# Patient Record
Sex: FEMALE | Race: BLACK OR AFRICAN AMERICAN | NOT HISPANIC OR LATINO | Employment: OTHER | URBAN - METROPOLITAN AREA
[De-identification: names, ages, dates, MRNs, and addresses within clinical notes are randomized per-mention and may not be internally consistent; named-entity substitution may affect disease eponyms.]

---

## 2017-01-20 ENCOUNTER — TELEPHONE (OUTPATIENT)
Dept: HEMATOLOGY/ONCOLOGY | Facility: CLINIC | Age: 27
End: 2017-01-20

## 2017-01-27 RX ORDER — HYDROMORPHONE HYDROCHLORIDE 2 MG/ML
1 INJECTION, SOLUTION INTRAMUSCULAR; INTRAVENOUS; SUBCUTANEOUS EVERY 6 HOURS PRN
Status: CANCELLED
Start: 2017-01-27

## 2017-02-03 DIAGNOSIS — D57.1 HB-SS DISEASE WITHOUT CRISIS: Primary | ICD-10-CM

## 2017-02-10 ENCOUNTER — HOSPITAL ENCOUNTER (OUTPATIENT)
Dept: TRANSFUSION MEDICINE | Facility: HOSPITAL | Age: 27
Discharge: HOME | End: 2017-02-10
Attending: INTERNAL MEDICINE
Payer: MEDICARE

## 2017-02-10 ENCOUNTER — OFFICE VISIT (OUTPATIENT)
Dept: HEMATOLOGY/ONCOLOGY | Facility: CLINIC | Age: 27
End: 2017-02-10
Payer: MEDICARE

## 2017-02-10 VITALS
SYSTOLIC BLOOD PRESSURE: 106 MMHG | HEART RATE: 118 BPM | WEIGHT: 147 LBS | HEIGHT: 65 IN | BODY MASS INDEX: 24.49 KG/M2 | TEMPERATURE: 98 F | RESPIRATION RATE: 20 BRPM | DIASTOLIC BLOOD PRESSURE: 74 MMHG

## 2017-02-10 VITALS
WEIGHT: 147.06 LBS | DIASTOLIC BLOOD PRESSURE: 68 MMHG | OXYGEN SATURATION: 95 % | SYSTOLIC BLOOD PRESSURE: 101 MMHG | TEMPERATURE: 98 F | HEART RATE: 83 BPM

## 2017-02-10 DIAGNOSIS — Z86.73 HISTORY OF STROKE: ICD-10-CM

## 2017-02-10 DIAGNOSIS — D57.1 HB-SS DISEASE WITHOUT CRISIS: Primary | ICD-10-CM

## 2017-02-10 DIAGNOSIS — D57.00 HB-SS DISEASE WITH CRISIS: Primary | ICD-10-CM

## 2017-02-10 LAB
BLD PROD TYP BPU: NORMAL
BLOOD UNIT EXPIRATION DATE: NORMAL
BLOOD UNIT TYPE CODE: 7300
BLOOD UNIT TYPE: NORMAL
CODING SYSTEM: NORMAL
DISPENSE STATUS: NORMAL
NUM UNITS TRANS PACKED RBC: NORMAL

## 2017-02-10 PROCEDURE — 86920 COMPATIBILITY TEST SPIN: CPT

## 2017-02-10 PROCEDURE — 99214 OFFICE O/P EST MOD 30 MIN: CPT | Mod: S$PBB,,, | Performed by: INTERNAL MEDICINE

## 2017-02-10 PROCEDURE — 36415 COLL VENOUS BLD VENIPUNCTURE: CPT

## 2017-02-10 PROCEDURE — P9016 RBC LEUKOCYTES REDUCED: HCPCS

## 2017-02-10 PROCEDURE — 96366 THER/PROPH/DIAG IV INF ADDON: CPT

## 2017-02-10 PROCEDURE — 83020 HEMOGLOBIN ELECTROPHORESIS: CPT

## 2017-02-10 PROCEDURE — 80500 PR  LAB PATHOLOGY CONSULT-LTD: CPT | Mod: ,,, | Performed by: PATHOLOGY

## 2017-02-10 PROCEDURE — 36512 APHERESIS RBC: CPT

## 2017-02-10 PROCEDURE — 96365 THER/PROPH/DIAG IV INF INIT: CPT

## 2017-02-10 PROCEDURE — 96374 THER/PROPH/DIAG INJ IV PUSH: CPT | Mod: 59

## 2017-02-10 PROCEDURE — P9040 RBC LEUKOREDUCED IRRADIATED: HCPCS

## 2017-02-10 PROCEDURE — 36593 DECLOT VASCULAR DEVICE: CPT

## 2017-02-10 PROCEDURE — 63600175 PHARM REV CODE 636 W HCPCS: Performed by: PATHOLOGY

## 2017-02-10 PROCEDURE — 25000003 PHARM REV CODE 250: Performed by: PATHOLOGY

## 2017-02-10 PROCEDURE — 99999 PR PBB SHADOW E&M-EST. PATIENT-LVL III: CPT | Mod: PBBFAC,,, | Performed by: INTERNAL MEDICINE

## 2017-02-10 PROCEDURE — 36512 APHERESIS RBC: CPT | Mod: ,,, | Performed by: PATHOLOGY

## 2017-02-10 PROCEDURE — 96375 TX/PRO/DX INJ NEW DRUG ADDON: CPT | Mod: 59

## 2017-02-10 PROCEDURE — 86902 BLOOD TYPE ANTIGEN DONOR EA: CPT | Mod: 91

## 2017-02-10 RX ORDER — HEPARIN SODIUM 1000 [USP'U]/ML
4400 INJECTION, SOLUTION INTRAVENOUS; SUBCUTANEOUS ONCE
Status: COMPLETED | OUTPATIENT
Start: 2017-02-10 | End: 2017-02-10

## 2017-02-10 RX ORDER — DIPHENHYDRAMINE HYDROCHLORIDE 50 MG/ML
50 INJECTION INTRAMUSCULAR; INTRAVENOUS ONCE
Status: COMPLETED | OUTPATIENT
Start: 2017-02-10 | End: 2017-02-10

## 2017-02-10 RX ADMIN — HEPARIN SODIUM 4400 UNITS: 1000 INJECTION, SOLUTION INTRAVENOUS; SUBCUTANEOUS at 02:02

## 2017-02-10 RX ADMIN — ALTEPLASE 2 MG: 2.2 INJECTION, POWDER, LYOPHILIZED, FOR SOLUTION INTRAVENOUS at 12:02

## 2017-02-10 RX ADMIN — DIPHENHYDRAMINE HYDROCHLORIDE 50 MG: 50 INJECTION INTRAMUSCULAR; INTRAVENOUS at 01:02

## 2017-02-10 NOTE — PROCEDURES
[facility]  Pathology  Procedure Note    SUMMARY     Date of Procedure: 2/10/2017     Procedure: Red Blood Cell Exchange    Provider: Debbie Lambert MD     Assisting Provider: None    Pre-Procedure Diagnosis: sickle cell disease with history of multiple CVAs  Post-Procedure Diagnosis: sickle cell disease with history of multiple CVAs  Follow-up Assessment: 26 year old female from [place] with sickle cell disease and history of multiple CVAs presents for monthly RBC exchange transfusion. Patient is minimally verbal and is wheelchair bound. Care givers are present throughout procedure. Patient will be exchanged 5 units C, E, K negative sickle negative B+ blood. Goal exchange target is to increase post-exchange hct to 30+-3% and hgb S <30%  Sickle cell disease, non-acute, carries a Category I Grade 1B indication for RBC exchange for stroke prophylaxis/iron overload prevention via the 2016 Journal of Clinical Apheresis Guidelines (Ab J et al. Journal of Clinical Apheresis 2016; 31:149-162.) During long-term therapy, targeting a pre-transfusion threshold of 50% HbS may be as effective as 30% HbS    Patient will follow up next month.     Pertinent Laboratory Data:   Complete Blood Count:   Lab Results   Component Value Date    HGB 8.3 (L) 02/10/2017    HCT 23.7 (L) 02/10/2017     02/10/2017    WBC 7.29 02/10/2017       Pertinent Medications: hydrea 500 mg daily folic acid daily    Review of patient's allergies indicates:  No Known Allergies    Anesthesia: None     Technical Procedures Used: Red Blood Cell Exchange: Approximately 5 units of packed red blood cells were exchanged.    Description of the Findings of the Procedure:     Please see Apheresis Nurse flowsheet for details.    The patient was evaluated and all clinical and laboratory data relevant to the treatment was reviewed, and a decision was made to proceed with the Apheresis procedure.    I was available to the clinical  staff throughout the procedure.    Significant Surgical Tasks Conducted by the Assistant(s): Not applicable    Complications: None    Estimated Blood Loss (EBL): None    Implants: None     Specimens: None    ATTENDING MD ATTESTATION:  The apheresis procedure was performed under my supervision. I was available throughout the procedure. The note has been edited, where and when necessary, to reflect my agreement.  I reviewed all clinical and laboratory data and reviewed the note written by Dr. Pavon and agree with the findings, assessment and plan.     Lan is minimally verbal and able to communicate some simple requests. Her family consented for the procedure for her. The patient tolerated the procedure without complications and was discharged to home in good condition. The next scheduled procedure will be next month. A post-procedural HbSS was ordered to evaluate efficacy of the RBC exchange.

## 2017-02-10 NOTE — CONSULTS
[facility]  Consult Note  Pathology    SUBJECTIVE:     Reason for Consultation:  Patient is a 26 y.o. female with a history of sickle cell disease with multiple resultant strokes presents for monthly maintenance RBC Exchange. She is nonverbal and wheelchair bound, commuting from [place], [place] with her siblings. She is accompanied today by her sister.    Sickle cell disease, non-acute, carries a Category I Grade 1B indication for RBC exchange for stroke prophylaxis/iron overload prevention via the 2016 Journal of Clinical Apheresis Guidelines (Berger J et al. Journal of Clinical Apheresis 2016; 31:149-162.) During long-term therapy, targeting a pre-transfusion threshold of 50% HbS may be as effective as 30% HbS.     Today's exchange will be targeted to increase post-exchange hematocrit to 30+3% (< 33-36% to avoid hyperviscosity). Her last known HbS level is recorded as 73%. Target post-RBC exchange HbS is <30% (11/30/16).    Pertinent Medications: Hydrea 500mg daily    Review of patient's allergies indicates: No Known Allergies    OBJECTIVE:     Pertinent Laboratory Data:   Complete Blood Count:   Lab Results   Component Value Date    HGB 8.3 (L) 02/10/2017    HCT 23.7 (L) 02/10/2017     02/10/2017    WBC 7.29 02/10/2017     Hemoglobin Electrophoresis,Hgb A2 Nate.   Status:  Final result   Visible to patient:  No (Not Released) Next appt:  Today at 02:00 PM in Lab ([facility]) Dx:  Hb-SS disease without crisis Order: [id number]        Ref Range & Units 2mo ago     Hgb A2 Quant 2.2 - 3.2 % 2.7   Hemoglobin Bands  Hb S , Hb F , Hb A2   Hemoglobin Electrophoresis Interp  See comment   Comments: There are prominent bands in the S and F positions,   measuring approximately 73 % and 24 % respectively.  This   pattern suggests sickle cell anemia.  Await acid electrophoresis   for confirmation of hemoglobin S.   The increased hemoglobin F could be attributable to Hydrea therapy.      Correlate clinically.   Interpreted by Fina Boyer M.D.                ASSESSMENT/PLAN:     Access: Vortex Double Port  Number of Procedures: monthly maintenance  Schedule: as above  Volume: 5 RBC units C, E, K matched, leukoreduced, sickle negative  Recommended Laboratory Studies: HbS %

## 2017-02-10 NOTE — PROGRESS NOTES
SECTION OF HEMATOLOGY AND BONE MARROW TRANSPLANT  Return  Patient Visit   2017  Referred by:  No ref. provider found  Referred for: SCD, exchange transfusion     CHIEF COMPLAINT:   Chief Complaint   Patient presents with    Follow-up       HISTORY OF PRESENT ILLNESS:   27 yo female, non verbal due to multiple prior cva, sickle cell disease, referred 2016 for exchange transfusion.   She comes accompanied by her brother and his partner.  The patient's aunt was her previous full time caretaker but recently  and the brother and girlfriend took over care.  They do not know many details of her medical history.  Patient was previously seen at [facility] and apparently was getting monthly exchange transfusions via port but that transportation burden between [place] and [place] had become too much.  She recently established care with Dr. Song in [place] who placed the patient on hydrea.  Caretaker is unaware of any other specific sickle related complications.  The patient goes to the ED infrequently and it has been over a year since her last admission.       She presents today prior to her first exchange transfusion.    No change in clinical status since our last appt.    PAST MEDICAL HISTORY:   Past Medical History   Diagnosis Date    Anemia     Encounter for blood transfusion     Neuromuscular disorder     Seizures     Sickle cell anemia     Stroke        PAST SURGICAL HISTORY:   Past Surgical History   Procedure Laterality Date    Portacath placement         PAST SOCIAL HISTORY:   reports that she has never smoked. She does not have any smokeless tobacco history on file. She reports that she does not drink alcohol or use illicit drugs.    FAMILY HISTORY:  Family History   Problem Relation Age of Onset    Kidney failure Mother     No Known Problems Brother        CURRENT MEDICATIONS:   Current Outpatient Prescriptions   Medication Sig    baclofen (LIORESAL) 10 MG tablet  Take 1 tablet by mouth 3 (three) times daily as needed.    carbamazepine (CARBATROL) 200 MG CM12 Take 1 mg by mouth 4 (four) times daily.    clonazePAM (KLONOPIN) 0.5 MG tablet Take 1 tablet by mouth 2 (two) times daily.    folic acid (FOLVITE) 1 MG tablet Take 1 mg by mouth once daily.    hydrocodone-acetaminophen 7.5-325mg (NORCO) 7.5-325 mg per tablet Take 1 tablet by mouth 3 (three) times daily.    hydroxyurea (HYDREA) 500 mg Cap Take 500 mg by mouth once daily.    levetiracetam (KEPPRA) 1000 MG tablet Take 1 tablet by mouth 2 (two) times daily.    senna-docusate 8.6-50 mg (SENNA WITH DOCUSATE SODIUM) 8.6-50 mg per tablet Take 1 tablet by mouth once daily.     No current facility-administered medications for this visit.      ALLERGIES:   Review of patient's allergies indicates:  No Known Allergies          REVIEW OF SYSTEMS:   Unable to obtain ROS due to mental status      PHYSICAL EXAM:   Vitals:    02/10/17 0804   BP: 101/68   Pulse: 83   Temp: 98.1 °F (36.7 °C)       General - non verbal, contractured wheelchair bound, thin, non verbal ; appears comfortable  Head & Face - no sinus tenderness  Eyes - normal conjunctivae and lids   ENT - normal external auditory canals and tympanic membranes bilaterally oropharynx clear,  Normal dentition and gums  Neck - normal thyroid  Chest and Lung - normal respiratory effort, clear to auscultation bilaterally   Cardiovascular - RRR with no MGR, normal S1 and S2; no pedal edema  Abdomen -  soft, nontender, no palpable hepatomegaly or splenomegaly  Lymph - no palpable lymphadenopathy  Extremities - unremarkable nails and digits  Heme - no bruising, petechiae, pallor  Skin - no rashes or lesion      ECOG Performance Status: (foot note - ECOG PS provided by Eastern Cooperative Oncology Group) 3 - Symptomatic, >50% confined to bed    Karnofsky Performance Score:  40%- Disabled: Requires Special Care and Assistance  DATA:   Lab Results   Component Value Date    WBC 7.29  02/10/2017    HGB 8.3 (L) 02/10/2017    HCT 23.7 (L) 02/10/2017     (H) 02/10/2017     02/10/2017     Gran #   Date Value Ref Range Status   02/10/2017 Test Not Performed 1.8 - 7.7 K/uL Corrected     Comment:     Corrected result; previously reported as 2.8 on 02/10/2017 at 09:14.     Gran%   Date Value Ref Range Status   02/10/2017 46.0 38.0 - 73.0 % Corrected     Comment:     Corrected result; previously reported as 38.8 on 02/10/2017 at 09:14.     CMP  Sodium   Date Value Ref Range Status   02/10/2017 148 (H) 136 - 145 mmol/L Final     Potassium   Date Value Ref Range Status   02/10/2017 4.2 3.5 - 5.1 mmol/L Final     Chloride   Date Value Ref Range Status   02/10/2017 115 (H) 95 - 110 mmol/L Final     CO2   Date Value Ref Range Status   02/10/2017 26 23 - 29 mmol/L Final     Glucose   Date Value Ref Range Status   02/10/2017 96 70 - 110 mg/dL Final     BUN, Bld   Date Value Ref Range Status   02/10/2017 8 6 - 20 mg/dL Final     Creatinine   Date Value Ref Range Status   02/10/2017 0.7 0.5 - 1.4 mg/dL Final     Calcium   Date Value Ref Range Status   02/10/2017 8.5 (L) 8.7 - 10.5 mg/dL Final     Total Protein   Date Value Ref Range Status   02/10/2017 8.8 (H) 6.0 - 8.4 g/dL Final     Albumin   Date Value Ref Range Status   02/10/2017 3.3 (L) 3.5 - 5.2 g/dL Final     Total Bilirubin   Date Value Ref Range Status   02/10/2017 1.3 (H) 0.1 - 1.0 mg/dL Final     Comment:     For infants and newborns, interpretation of results should be based  on gestational age, weight and in agreement with clinical  observations.  Premature Infant recommended reference ranges:  Up to 24 hours.............<8.0 mg/dL  Up to 48 hours............<12.0 mg/dL  3-5 days..................<15.0 mg/dL  6-29 days.................<15.0 mg/dL       Alkaline Phosphatase   Date Value Ref Range Status   02/10/2017 270 (H) 55 - 135 U/L Final     AST   Date Value Ref Range Status   02/10/2017 58 (H) 10 - 40 U/L Final     ALT   Date Value  Ref Range Status   02/10/2017 37 10 - 44 U/L Final     Anion Gap   Date Value Ref Range Status   02/10/2017 7 (L) 8 - 16 mmol/L Final     eGFR if    Date Value Ref Range Status   02/10/2017 >60.0 >60 mL/min/1.73 m^2 Final     eGFR if non    Date Value Ref Range Status   02/10/2017 >60.0 >60 mL/min/1.73 m^2 Final     Comment:     Calculation used to obtain the estimated glomerular filtration  rate (eGFR) is the CKD-EPI equation. Since race is unknown   in our information system, the eGFR values for   -American and Non--American patients are given   for each creatinine result.       Lab Results   Component Value Date    FERRITIN 5316 (H) 02/10/2017   Results for LUIS SHAH (MRN [id number]) as of 12/1/2016 06:37   Ref. Range 11/30/2016 09:12   Retic Latest Ref Range: 0.5 - 2.5 % 5.3 (H)         ASSESSMENT AND PLAN:   Encounter Diagnoses   Name Primary?    Hb-SS disease with crisis Yes    History of stroke      -history of SCD with at least multiple CVA now nonverbal and wheelchair bound  -accompanying caretakers unclear of her specific medical history; she was receiving monthly exchange transfusions [facility] with interruption due to social issues; comes today to reestablish exchange; she has active port   -recommend continue hydrea 500mg daily; dose not appear to have frequent pain crisis  -continue folic acid daily  -continue al other current medications  - secondary prophylactic monthly exchange transfusions are indicated to prevent further CVA with goal  To keep hgb S <30% ; -baseline hepatitis and hiv testing   -will need to address iron chelation at next appt        Sickle Cell Disease Monitoring   1)Hydrea  -as above   2)Iron Overload  will need to address iron chelation at next appt    3)AVN  -GORAN  4)LE Ulcerations  -GORAN  5)HTN  -normotensive today  6)Opthalmic  -will send to [facility]  7)Pain  -appears comfortable today  -continue prn  norco  8)CardioPulmonary  -will need to obtain baseline TTE; repeat at [facility]  9)Renal  -obtain urine pr/cr at next appt  10)Neuro/CVA  -continue keppra for prior seizure  -exchnage transfusiosn as above    11)Vaccines   -will need to readminister vaccines and document at [facility] in the next 3 months  - HIB series, menactra, prevnar followed by pneumovax after 8 weeks     12)Contraception  -NA      -will need to obtain testing as above over period of 3 months due to patient integrating into a new health systme    Follow Up:  Cbc, cmp, type and screen, hemoglobin EP, 2d Echo, MD appt and exchange transfusion in 1 month     Jacob Hanson MD  Hematology/Oncology/Bone Marrow Transplant

## 2017-02-10 NOTE — Clinical Note
Cbc, cmp, type and screen, hemoglobin EP, 2d Echo, MD appt and exchange transfusion in 1 month  -labs, md appt must be first morning slot

## 2017-02-14 LAB
HGB FRACT BLD ELPH-IMP: NORMAL
HGB S MFR BLD ELPH: 12.2 %

## 2017-03-06 ENCOUNTER — HOSPITAL ENCOUNTER (OUTPATIENT)
Dept: TRANSFUSION MEDICINE | Facility: HOSPITAL | Age: 27
Discharge: HOME | End: 2017-03-06
Attending: INTERNAL MEDICINE
Payer: MEDICARE

## 2017-03-06 ENCOUNTER — OFFICE VISIT (OUTPATIENT)
Dept: HEMATOLOGY/ONCOLOGY | Facility: CLINIC | Age: 27
End: 2017-03-06
Payer: MEDICARE

## 2017-03-06 ENCOUNTER — LAB VISIT (OUTPATIENT)
Dept: LAB | Facility: HOSPITAL | Age: 27
End: 2017-03-06
Attending: INTERNAL MEDICINE
Payer: MEDICARE

## 2017-03-06 ENCOUNTER — HOSPITAL ENCOUNTER (OUTPATIENT)
Dept: CARDIOLOGY | Facility: CLINIC | Age: 27
Discharge: HOME | End: 2017-03-06
Payer: MEDICARE

## 2017-03-06 VITALS
SYSTOLIC BLOOD PRESSURE: 110 MMHG | BODY MASS INDEX: 24.32 KG/M2 | HEART RATE: 110 BPM | TEMPERATURE: 99 F | RESPIRATION RATE: 18 BRPM | HEIGHT: 65 IN | DIASTOLIC BLOOD PRESSURE: 81 MMHG | WEIGHT: 146 LBS

## 2017-03-06 VITALS
HEART RATE: 100 BPM | TEMPERATURE: 98 F | SYSTOLIC BLOOD PRESSURE: 114 MMHG | OXYGEN SATURATION: 98 % | RESPIRATION RATE: 18 BRPM | DIASTOLIC BLOOD PRESSURE: 68 MMHG

## 2017-03-06 DIAGNOSIS — D57.1 HB-SS DISEASE WITHOUT CRISIS: ICD-10-CM

## 2017-03-06 DIAGNOSIS — D57.1 HB-SS DISEASE WITHOUT CRISIS: Primary | ICD-10-CM

## 2017-03-06 DIAGNOSIS — D57.00 HB-SS DISEASE WITH CRISIS: ICD-10-CM

## 2017-03-06 DIAGNOSIS — I69.30 HISTORY OF CVA WITH RESIDUAL DEFICIT: ICD-10-CM

## 2017-03-06 LAB
ABO + RH BLD: NORMAL
ALBUMIN SERPL BCP-MCNC: 3.1 G/DL
ALP SERPL-CCNC: 262 U/L
ALT SERPL W/O P-5'-P-CCNC: 25 U/L
ANION GAP SERPL CALC-SCNC: 8 MMOL/L
ANISOCYTOSIS BLD QL SMEAR: ABNORMAL
AST SERPL-CCNC: 42 U/L
BASOPHILS # BLD AUTO: ABNORMAL K/UL
BASOPHILS NFR BLD: 0 %
BILIRUB SERPL-MCNC: 0.8 MG/DL
BLD GP AB SCN CELLS X3 SERPL QL: NORMAL
BLD PROD TYP BPU: NORMAL
BLOOD UNIT EXPIRATION DATE: NORMAL
BLOOD UNIT TYPE CODE: 5100
BLOOD UNIT TYPE CODE: 9500
BLOOD UNIT TYPE: NORMAL
BUN SERPL-MCNC: 8 MG/DL
CALCIUM SERPL-MCNC: 8 MG/DL
CHLORIDE SERPL-SCNC: 112 MMOL/L
CO2 SERPL-SCNC: 24 MMOL/L
CODING SYSTEM: NORMAL
CREAT SERPL-MCNC: 0.6 MG/DL
DIASTOLIC DYSFUNCTION: NO
DIFFERENTIAL METHOD: ABNORMAL
DISPENSE STATUS: NORMAL
EOSINOPHIL # BLD AUTO: ABNORMAL K/UL
EOSINOPHIL NFR BLD: 2 %
ERYTHROCYTE [DISTWIDTH] IN BLOOD BY AUTOMATED COUNT: 22.9 %
EST. GFR  (AFRICAN AMERICAN): >60 ML/MIN/1.73 M^2
EST. GFR  (NON AFRICAN AMERICAN): >60 ML/MIN/1.73 M^2
FERRITIN SERPL-MCNC: 3323 NG/ML
GLUCOSE SERPL-MCNC: 83 MG/DL
HCT VFR BLD AUTO: 25.1 %
HGB A2 MFR BLD HPLC: 3.4 %
HGB BLD-MCNC: 8.1 G/DL
HGB FRACT BLD ELPH-IMP: ABNORMAL
HGB FRACT BLD ELPH-IMP: ABNORMAL
HOWELL-JOLLY BOD BLD QL SMEAR: ABNORMAL
HYPOCHROMIA BLD QL SMEAR: ABNORMAL
LYMPHOCYTES # BLD AUTO: ABNORMAL K/UL
LYMPHOCYTES NFR BLD: 43 %
MCH RBC QN AUTO: 34 PG
MCHC RBC AUTO-ENTMCNC: 32.3 %
MCV RBC AUTO: 106 FL
MONOCYTES # BLD AUTO: ABNORMAL K/UL
MONOCYTES NFR BLD: 4 %
NEUTROPHILS NFR BLD: 51 %
NRBC BLD-RTO: ABNORMAL /100 WBC
NUM UNITS TRANS PACKED RBC: NORMAL
OVALOCYTES BLD QL SMEAR: ABNORMAL
PLATELET # BLD AUTO: 190 K/UL
PMV BLD AUTO: 9.7 FL
POIKILOCYTOSIS BLD QL SMEAR: SLIGHT
POLYCHROMASIA BLD QL SMEAR: ABNORMAL
POTASSIUM SERPL-SCNC: 4.2 MMOL/L
PROT SERPL-MCNC: 8.1 G/DL
RBC # BLD AUTO: 2.38 M/UL
RETICS/RBC NFR AUTO: 11.8 %
RETIRED EF AND QEF - SEE NOTES: 55 (ref 55–65)
SICKLE CELLS BLD QL SMEAR: ABNORMAL
SODIUM SERPL-SCNC: 144 MMOL/L
TARGETS BLD QL SMEAR: ABNORMAL
WBC # BLD AUTO: 6.48 K/UL

## 2017-03-06 PROCEDURE — 36415 COLL VENOUS BLD VENIPUNCTURE: CPT

## 2017-03-06 PROCEDURE — 86901 BLOOD TYPING SEROLOGIC RH(D): CPT

## 2017-03-06 PROCEDURE — 99214 OFFICE O/P EST MOD 30 MIN: CPT | Mod: S$PBB,,, | Performed by: INTERNAL MEDICINE

## 2017-03-06 PROCEDURE — 99999 PR PBB SHADOW E&M-EST. PATIENT-LVL III: CPT | Mod: PBBFAC,,, | Performed by: INTERNAL MEDICINE

## 2017-03-06 PROCEDURE — 36512 APHERESIS RBC: CPT | Mod: ,,, | Performed by: PATHOLOGY

## 2017-03-06 PROCEDURE — 85045 AUTOMATED RETICULOCYTE COUNT: CPT

## 2017-03-06 PROCEDURE — 85007 BL SMEAR W/DIFF WBC COUNT: CPT

## 2017-03-06 PROCEDURE — 86900 BLOOD TYPING SEROLOGIC ABO: CPT

## 2017-03-06 PROCEDURE — 80053 COMPREHEN METABOLIC PANEL: CPT

## 2017-03-06 PROCEDURE — 85027 COMPLETE CBC AUTOMATED: CPT

## 2017-03-06 PROCEDURE — 82728 ASSAY OF FERRITIN: CPT

## 2017-03-06 PROCEDURE — 93307 TTE W/O DOPPLER COMPLETE: CPT | Mod: 26,S$PBB,, | Performed by: INTERNAL MEDICINE

## 2017-03-06 PROCEDURE — 83020 HEMOGLOBIN ELECTROPHORESIS: CPT

## 2017-03-06 RX ORDER — HEPARIN SODIUM 1000 [USP'U]/ML
4400 INJECTION, SOLUTION INTRAVENOUS; SUBCUTANEOUS ONCE
Status: COMPLETED | OUTPATIENT
Start: 2017-03-06 | End: 2017-03-06

## 2017-03-06 RX ORDER — DIPHENHYDRAMINE HYDROCHLORIDE 50 MG/ML
50 INJECTION INTRAMUSCULAR; INTRAVENOUS ONCE
Status: COMPLETED | OUTPATIENT
Start: 2017-03-06 | End: 2017-03-06

## 2017-03-06 RX ADMIN — HEPARIN SODIUM 4400 UNITS: 1000 INJECTION, SOLUTION INTRAVENOUS; SUBCUTANEOUS at 02:03

## 2017-03-06 RX ADMIN — DIPHENHYDRAMINE HYDROCHLORIDE 50 MG: 50 INJECTION INTRAMUSCULAR; INTRAVENOUS at 12:03

## 2017-03-06 RX ADMIN — ALTEPLASE 2 MG: 2.2 INJECTION, POWDER, LYOPHILIZED, FOR SOLUTION INTRAVENOUS at 11:03

## 2017-03-06 NOTE — PROGRESS NOTES
Middle chest vortex port successfully accessed with 2, 17 gauge martinez needles prior to exchange, cathflo instilled in both sides at 1130, withdrew with ease at 1225.  Red cell exchange started at 1230 without difficulty.  Pt oriented x4.  Pt nods her head no when asked if she has pain.  Replacement fluids 5 units of pRBC's.  50 mg of bendryl given IVP prior to exchange.  Tx ended at 1412.  Port flushed and locked.  Pt tolerated tx well.  Dr Hanson's office will call to schedule next appt.  Pt exited dept in a wheelchair accompanied by her brother and sister in law.

## 2017-03-06 NOTE — PROGRESS NOTES
SECTION OF HEMATOLOGY AND BONE MARROW TRANSPLANT  Return  Patient Visit   2017  Referred by:  No ref. provider found  Referred for: SCD, exchange transfusion     CHIEF COMPLAINT:   No chief complaint on file.      HISTORY OF PRESENT ILLNESS:   25 yo female, non verbal due to multiple prior cva, sickle cell disease, referred 2016 for exchange transfusion.   She comes accompanied by her brother and his partner.  The patient's aunt was her previous full time caretaker but recently  and the brother and girlfriend took over care.  They do not know many details of her medical history.  Patient was previously seen at [facility] and apparently was getting monthly exchange transfusions via port but that transportation burden between [place] and [place] had become too much.  She recently established care with Dr. Song in [place] who placed the patient on hydrea.  Caretaker is unaware of any other specific sickle related complications.  The patient goes to the ED infrequently and it has been over a year since her last admission.       She presents today prior [place] [facility] [place]    Sister states since resumption of exchange transfusions she is more alert and has more energy. Eating better.  No other changes in clinical status.     PAST MEDICAL HISTORY:   Past Medical History:   Diagnosis Date    Anemia     Encounter for blood transfusion     Neuromuscular disorder     Seizures     Sickle cell anemia     Stroke        PAST SURGICAL HISTORY:   Past Surgical History:   Procedure Laterality Date    PORTACATH PLACEMENT         PAST SOCIAL HISTORY:   reports that she has never smoked. She does not have any smokeless tobacco history on file. She reports that she does not drink alcohol or use illicit drugs.    FAMILY HISTORY:  Family History   Problem Relation Age of Onset    Kidney failure Mother     No Known Problems Brother        CURRENT MEDICATIONS:   Current  Outpatient Prescriptions   Medication Sig    baclofen (LIORESAL) 10 MG tablet Take 1 tablet by mouth 3 (three) times daily as needed.    carbamazepine (CARBATROL) 200 MG CM12 Take 1 mg by mouth 4 (four) times daily.    clonazePAM (KLONOPIN) 0.5 MG tablet Take 1 tablet by mouth 2 (two) times daily.    folic acid (FOLVITE) 1 MG tablet Take 1 mg by mouth once daily.    hydrocodone-acetaminophen 7.5-325mg (NORCO) 7.5-325 mg per tablet Take 1 tablet by mouth 3 (three) times daily.    hydroxyurea (HYDREA) 500 mg Cap Take 500 mg by mouth once daily.    levetiracetam (KEPPRA) 1000 MG tablet Take 1 tablet by mouth 2 (two) times daily.    senna-docusate 8.6-50 mg (SENNA WITH DOCUSATE SODIUM) 8.6-50 mg per tablet Take 1 tablet by mouth once daily.     No current facility-administered medications for this visit.      ALLERGIES:   Review of patient's allergies indicates:  No Known Allergies          REVIEW OF SYSTEMS:   Unable to obtain ROS due to mental status      PHYSICAL EXAM:   Vitals:    03/06/17 0820   BP: 114/68   Pulse: 100   Resp: 18   Temp: 98.1 °F (36.7 °C)       General - non verbal, contractured wheelchair bound, thin, non verbal ; appears comfortable  Head & Face - no sinus tenderness  Eyes - normal conjunctivae and lids   ENT - normal external auditory canals and tympanic membranes bilaterally oropharynx clear,  Normal dentition and gums  Neck - normal thyroid  Chest and Lung - normal respiratory effort, clear to auscultation bilaterally   Cardiovascular - RRR with no MGR, normal S1 and S2; no pedal edema  Abdomen -  soft, nontender, no palpable hepatomegaly or splenomegaly  Lymph - no palpable lymphadenopathy  Extremities - unremarkable nails and digits  Heme - no bruising, petechiae, pallor  Skin - no rashes or lesion      ECOG Performance Status: (foot note - ECOG PS provided by Eastern Cooperative Oncology Group) 3 - Symptomatic, >50% confined to bed    Karnofsky Performance Score:  40%- Disabled:  Requires Special Care and Assistance  DATA:   Lab Results   Component Value Date    WBC 6.48 03/06/2017    HGB 8.1 (L) 03/06/2017    HCT 25.1 (L) 03/06/2017     (H) 03/06/2017     03/06/2017     Gran #   Date Value Ref Range Status   02/10/2017 Test Not Performed 1.8 - 7.7 K/uL Corrected     Comment:     Corrected result; previously reported as 2.8 on 02/10/2017 at 09:14.     Gran%   Date Value Ref Range Status   03/06/2017 51.0 38.0 - 73.0 % Final     CMP  Sodium   Date Value Ref Range Status   03/06/2017 144 136 - 145 mmol/L Final     Potassium   Date Value Ref Range Status   03/06/2017 4.2 3.5 - 5.1 mmol/L Final     Chloride   Date Value Ref Range Status   03/06/2017 112 (H) 95 - 110 mmol/L Final     CO2   Date Value Ref Range Status   03/06/2017 24 23 - 29 mmol/L Final     Glucose   Date Value Ref Range Status   03/06/2017 83 70 - 110 mg/dL Final     BUN, Bld   Date Value Ref Range Status   03/06/2017 8 6 - 20 mg/dL Final     Creatinine   Date Value Ref Range Status   03/06/2017 0.6 0.5 - 1.4 mg/dL Final     Calcium   Date Value Ref Range Status   03/06/2017 8.0 (L) 8.7 - 10.5 mg/dL Final     Total Protein   Date Value Ref Range Status   03/06/2017 8.1 6.0 - 8.4 g/dL Final     Albumin   Date Value Ref Range Status   03/06/2017 3.1 (L) 3.5 - 5.2 g/dL Final     Total Bilirubin   Date Value Ref Range Status   03/06/2017 0.8 0.1 - 1.0 mg/dL Final     Comment:     For infants and newborns, interpretation of results should be based  on gestational age, weight and in agreement with clinical  observations.  Premature Infant recommended reference ranges:  Up to 24 hours.............<8.0 mg/dL  Up to 48 hours............<12.0 mg/dL  3-5 days..................<15.0 mg/dL  6-29 days.................<15.0 mg/dL       Alkaline Phosphatase   Date Value Ref Range Status   03/06/2017 262 (H) 55 - 135 U/L Final     AST   Date Value Ref Range Status   03/06/2017 42 (H) 10 - 40 U/L Final     ALT   Date Value Ref Range  Status   03/06/2017 25 10 - 44 U/L Final     Anion Gap   Date Value Ref Range Status   03/06/2017 8 8 - 16 mmol/L Final     eGFR if    Date Value Ref Range Status   03/06/2017 >60.0 >60 mL/min/1.73 m^2 Final     eGFR if non    Date Value Ref Range Status   03/06/2017 >60.0 >60 mL/min/1.73 m^2 Final     Comment:     Calculation used to obtain the estimated glomerular filtration  rate (eGFR) is the CKD-EPI equation. Since race is unknown   in our information system, the eGFR values for   -American and Non--American patients are given   for each creatinine result.       Lab Results   Component Value Date    FERRITIN 3323 (H) 03/06/2017   Results for LUIS SHAH (MRN [id number]) as of 12/1/2016 06:37   Ref. Range 11/30/2016 09:12   Retic Latest Ref Range: 0.5 - 2.5 % 5.3 (H)     Basket      Reviewed  Result Note  View in In Basket        MyChart Results Release      MyChart Status: Inactive           Hemoglobin Electrophoresis,Hgb A2 Nate.   Status:  Final result   Visible to patient:  No (Not Released) Next appt:  None Dx:  Hb-SS disease with crisis Order: [id number]           Ref Range & Units 1d ago   3wk ago   3mo ago      Hgb A2 Quant 2.2 - 3.2 % 3.4 (H)  2.7    Hemoglobin Bands  Hb A and Hb S Hb F and Hb A2  Hb S , Hb F , Hb A2    Hemoglobin Electrophoresis Interp  See comment See commentCM See commentCM   Comments: Interpreted by Vanessa Hatch M.D.   There are prominent bands in the A and S positions,   measuring approximately 45 % and 42 % respectively.  There is also a   hemoglobin F band of approximately 8%.  This   pattern is consistent with the patient history of sickle cell anemia   and recent RBC transfusion.              TTE - 3/6/17  CONCLUSIONS     1 - Normal left ventricular systolic function (EF 55-60%).     2 - Normal left ventricular diastolic function.     3 - Normal right ventricular systolic function .  ASSESSMENT AND PLAN:   Encounter  Diagnoses   Name Primary?   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    History of CVA with residual deficit      -history of SCD with at least multiple CVA now nonverbal and wheelchair bound  -accompanying caretakers unclear of her specific medical history; she was receiving monthly exchange transfusions at [facility] with interruption due to social issues; resumed exchanged transfusions at [facility] [place]  -recommend continue hydrea 500mg daily; dose not appear to have frequent pain crisis  -continue folic acid daily  -continue al other current medications  - secondary prophylactic monthly exchange transfusions are indicated to prevent further CVA with goal  To keep hgb S <30%; hgb S significant improved with first transfusion, proceed with 2nd today   -discussed iron chelation at length with her family and they do not want to proceed given risks and C       Sickle Cell Disease Monitoring   1)Hydrea  -as above   2)Iron Overload  -as above; she has no over evidence of end organ damage from iron overload  3)AVN  -GORAN  4)LE Ulcerations  -GORAN  5)HTN  -normotensive today  6)Opthalmic  -will send to [facility] opthalmology at next appt   7)Pain  -appears comfortable today  -continue prn norco  8)CardioPulmonary  - baseline TTE today with no major abnormality ; next due march 2019    9)Renal  -obtain urine pr/cr at next appt    10)Neuro/CVA  -continue keppra for prior seizure  -exchange transfusiosn as above    11)Vaccines   -will need to readminister vaccines and document [place] [facility] [place] in the next 3 months  - HIB series, menactra, prevnar followed by pneumovax after 8 weeks     12)Contraception  -NA      -will need to obtain testing as above over period of 3-6  months due to patient integrating into a new health system    Follow Up:  Cbc, cmp, type and screen, hemoglobin EP, opthalmology appt , vaccines, and MD appt and exchange transfusion in 1 month     Jacob Hanson MD  Hematology/Oncology/Bone Marrow Transplant

## 2017-03-06 NOTE — PROCEDURES
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 Patient is minimally verbal and is wheelchair bound. Care givers are present throughout procedure. Patient will be exchanged 5 units C, E, K antigen matched, LR, sickle negative, B+ blood. Goal exchange target is to increase post-exchange hct to 30%.    Sickle cell disease, non-acute, carries a Category I Grade 1B indication for RBC exchange for stroke prophylaxis/iron overload prevention via the 2016 Journal of Clinical Apheresis Guidelines (Berger J et al. Journal of Clinical Apheresis 2016; 31:149-162.) During long-term therapy, targeting a pre-transfusion threshold of 50% HbS may be as effective as 30% HbS.    Pertinent Laboratory Data:   2/10 Post-procedural HgbS 12%  3/6 Pre-procedural HgbS 42%    Complete Blood Count:   Lab Results   Component Value Date    HGB 8.1 (L) 03/06/2017    HCT 25.1 (L) 03/06/2017     03/06/2017    WBC 6.48 03/06/2017       Pertinent Medications: hydrea 500 mg daily folic acid daily    Review of patient's allergies indicates:  No Known Allergies    Anesthesia: None     Technical Procedures Used: Red Blood Cell Exchange: Approximately 5 units of packed red blood cells were exchanged.    Description of the Findings of the Procedure:     Please see Apheresis Nurse flowsheet for details.    The patient was evaluated and all clinical and laboratory data relevant to the treatment was reviewed, and a decision was made to proceed with the Apheresis procedure.    I was available to the clinical staff throughout the  procedure.    Significant Surgical Tasks Conducted by the Assistant(s): Not applicable  Complications: None  Estimated Blood Loss (EBL): None  Implants: None   Specimens: None    Dory Meyer MD, PhD  Section of Transfusion Medicine & Histocompatibility  Department of Pathology and Laboratory Medicine  [facility]  [phone] (HLA & Blood Bank Offices)  03/06/2017

## 2017-03-06 NOTE — Clinical Note
-cbc, cmp, retic, ferritin, type and screen, hgb electrophoresis and MD appt early am -please schedule vaccines: HIB series, menactra, prevnar after appt with me;   followed by pneumovax  In 2 months -please refer to opthalmology immediately after appt with me  -exchange transfusion in the afternoon -all of these should be scheduled the same day due to patient travelling from far away

## 2017-03-07 ENCOUNTER — TELEPHONE (OUTPATIENT)
Dept: HEMATOLOGY/ONCOLOGY | Facility: CLINIC | Age: 27
End: 2017-03-07

## 2017-03-07 NOTE — TELEPHONE ENCOUNTER
----- Message from Elizabet Rudolph sent at 3/7/2017 12:23 PM CST -----  Contact: Ms Oscar/   sister  Ms Oscar states brought patient to appointment on yesterday.  Ms Oscar states need doctor excuse stating that she brought sister to doctor appointment to give to her job.    Ms Oscar would like excuse email to [email or web address] Please call Ms Oscar [phone]  If any questions

## 2017-04-21 ENCOUNTER — INITIAL CONSULT (OUTPATIENT)
Dept: OPTOMETRY | Facility: CLINIC | Age: 27
End: 2017-04-21
Payer: MEDICARE

## 2017-04-21 ENCOUNTER — HOSPITAL ENCOUNTER (EMERGENCY)
Facility: HOSPITAL | Age: 27
Discharge: HOME | End: 2017-04-21
Attending: EMERGENCY MEDICINE
Payer: MEDICARE

## 2017-04-21 ENCOUNTER — OFFICE VISIT (OUTPATIENT)
Dept: HEMATOLOGY/ONCOLOGY | Facility: CLINIC | Age: 27
End: 2017-04-21
Payer: MEDICARE

## 2017-04-21 ENCOUNTER — HOSPITAL ENCOUNTER (OUTPATIENT)
Dept: TRANSFUSION MEDICINE | Facility: HOSPITAL | Age: 27
Discharge: HOME | End: 2017-04-21
Attending: INTERNAL MEDICINE
Payer: MEDICARE

## 2017-04-21 VITALS
RESPIRATION RATE: 16 BRPM | SYSTOLIC BLOOD PRESSURE: 93 MMHG | TEMPERATURE: 98 F | HEART RATE: 113 BPM | BODY MASS INDEX: 19.97 KG/M2 | OXYGEN SATURATION: 100 % | DIASTOLIC BLOOD PRESSURE: 64 MMHG | WEIGHT: 120 LBS

## 2017-04-21 VITALS
WEIGHT: 125 LBS | BODY MASS INDEX: 20.8 KG/M2 | SYSTOLIC BLOOD PRESSURE: 95 MMHG | DIASTOLIC BLOOD PRESSURE: 69 MMHG | HEART RATE: 107 BPM

## 2017-04-21 DIAGNOSIS — I69.30 HISTORY OF CVA WITH RESIDUAL DEFICIT: ICD-10-CM

## 2017-04-21 DIAGNOSIS — H52.03 HYPEROPIA, BILATERAL: ICD-10-CM

## 2017-04-21 DIAGNOSIS — J95.84 TRALI (TRANSFUSION RELATED ACUTE LUNG INJURY): Primary | ICD-10-CM

## 2017-04-21 DIAGNOSIS — R07.9 CHEST PAIN: ICD-10-CM

## 2017-04-21 DIAGNOSIS — D57.1 HB-SS DISEASE WITHOUT CRISIS: Primary | ICD-10-CM

## 2017-04-21 DIAGNOSIS — D57.1 HB-SS DISEASE WITHOUT CRISIS: ICD-10-CM

## 2017-04-21 LAB
ALBUMIN SERPL BCP-MCNC: 2.9 G/DL
ALP SERPL-CCNC: 214 U/L
ALT SERPL W/O P-5'-P-CCNC: 24 U/L
ANION GAP SERPL CALC-SCNC: 7 MMOL/L
ANISOCYTOSIS BLD QL SMEAR: ABNORMAL
AST SERPL-CCNC: 45 U/L
BASOPHILS # BLD AUTO: ABNORMAL K/UL
BASOPHILS NFR BLD: 1 %
BILIRUB SERPL-MCNC: 1.4 MG/DL
BLD PROD TYP BPU: NORMAL
BLOOD UNIT EXPIRATION DATE: NORMAL
BLOOD UNIT TYPE CODE: 5100
BLOOD UNIT TYPE CODE: 9500
BLOOD UNIT TYPE: NORMAL
BUN SERPL-MCNC: 11 MG/DL
CALCIUM SERPL-MCNC: 7.9 MG/DL
CHLORIDE SERPL-SCNC: 115 MMOL/L
CO2 SERPL-SCNC: 27 MMOL/L
CODING SYSTEM: NORMAL
CREAT SERPL-MCNC: 0.7 MG/DL
DIFFERENTIAL METHOD: ABNORMAL
DISPENSE STATUS: NORMAL
EOSINOPHIL # BLD AUTO: ABNORMAL K/UL
EOSINOPHIL NFR BLD: 0 %
ERYTHROCYTE [DISTWIDTH] IN BLOOD BY AUTOMATED COUNT: 27.4 %
EST. GFR  (AFRICAN AMERICAN): >60 ML/MIN/1.73 M^2
EST. GFR  (NON AFRICAN AMERICAN): >60 ML/MIN/1.73 M^2
GLUCOSE SERPL-MCNC: 94 MG/DL
HCT VFR BLD AUTO: 22 %
HGB BLD-MCNC: 7.3 G/DL
HYPOCHROMIA BLD QL SMEAR: ABNORMAL
INR PPP: 1.1
LYMPHOCYTES # BLD AUTO: ABNORMAL K/UL
LYMPHOCYTES NFR BLD: 34 %
MCH RBC QN AUTO: 36.7 PG
MCHC RBC AUTO-ENTMCNC: 33.2 %
MCV RBC AUTO: 111 FL
MONOCYTES # BLD AUTO: ABNORMAL K/UL
MONOCYTES NFR BLD: 16 %
NEUTROPHILS NFR BLD: 49 %
NRBC BLD-RTO: ABNORMAL /100 WBC
NUM UNITS TRANS PACKED RBC: NORMAL
OVALOCYTES BLD QL SMEAR: ABNORMAL
PLATELET # BLD AUTO: 223 K/UL
PMV BLD AUTO: 9.9 FL
POIKILOCYTOSIS BLD QL SMEAR: SLIGHT
POLYCHROMASIA BLD QL SMEAR: ABNORMAL
POTASSIUM SERPL-SCNC: 4.2 MMOL/L
PROT SERPL-MCNC: 7.7 G/DL
PROTHROMBIN TIME: 11.7 SEC
RBC # BLD AUTO: 1.99 M/UL
SICKLE CELLS BLD QL SMEAR: ABNORMAL
SODIUM SERPL-SCNC: 149 MMOL/L
TARGETS BLD QL SMEAR: ABNORMAL
TROPONIN I SERPL DL<=0.01 NG/ML-MCNC: <0.006 NG/ML
WBC # BLD AUTO: 10.42 K/UL
WBC NRBC COR # BLD: 6.43 K/UL

## 2017-04-21 PROCEDURE — 99284 EMERGENCY DEPT VISIT MOD MDM: CPT | Mod: 25,27

## 2017-04-21 PROCEDURE — 99999 PR PBB SHADOW E&M-EST. PATIENT-LVL II: CPT | Mod: PBBFAC,,, | Performed by: INTERNAL MEDICINE

## 2017-04-21 PROCEDURE — 80053 COMPREHEN METABOLIC PANEL: CPT

## 2017-04-21 PROCEDURE — 92004 COMPRE OPH EXAM NEW PT 1/>: CPT | Mod: S$PBB,,, | Performed by: OPTOMETRIST

## 2017-04-21 PROCEDURE — 99999 PR PBB SHADOW E&M-EST. PATIENT-LVL III: CPT | Mod: PBBFAC,,, | Performed by: OPTOMETRIST

## 2017-04-21 PROCEDURE — 99285 EMERGENCY DEPT VISIT HI MDM: CPT | Mod: ,,, | Performed by: EMERGENCY MEDICINE

## 2017-04-21 PROCEDURE — 85007 BL SMEAR W/DIFF WBC COUNT: CPT

## 2017-04-21 PROCEDURE — 84484 ASSAY OF TROPONIN QUANT: CPT

## 2017-04-21 PROCEDURE — 99214 OFFICE O/P EST MOD 30 MIN: CPT | Mod: S$PBB,,, | Performed by: INTERNAL MEDICINE

## 2017-04-21 PROCEDURE — 36593 DECLOT VASCULAR DEVICE: CPT

## 2017-04-21 PROCEDURE — 85027 COMPLETE CBC AUTOMATED: CPT

## 2017-04-21 PROCEDURE — 93010 ELECTROCARDIOGRAM REPORT: CPT | Mod: ,,, | Performed by: INTERNAL MEDICINE

## 2017-04-21 PROCEDURE — 93005 ELECTROCARDIOGRAM TRACING: CPT

## 2017-04-21 PROCEDURE — 85610 PROTHROMBIN TIME: CPT

## 2017-04-21 PROCEDURE — 92015 DETERMINE REFRACTIVE STATE: CPT | Mod: ,,, | Performed by: OPTOMETRIST

## 2017-04-21 RX ORDER — DIPHENHYDRAMINE HYDROCHLORIDE 50 MG/ML
50 INJECTION INTRAMUSCULAR; INTRAVENOUS ONCE
Status: COMPLETED | OUTPATIENT
Start: 2017-04-21 | End: 2017-04-21

## 2017-04-21 RX ORDER — HEPARIN SODIUM 1000 [USP'U]/ML
4400 INJECTION, SOLUTION INTRAVENOUS; SUBCUTANEOUS ONCE
Status: COMPLETED | OUTPATIENT
Start: 2017-04-21 | End: 2017-04-21

## 2017-04-21 RX ADMIN — HEPARIN SODIUM 4400 UNITS: 1000 INJECTION, SOLUTION INTRAVENOUS; SUBCUTANEOUS at 01:04

## 2017-04-21 RX ADMIN — DIPHENHYDRAMINE HYDROCHLORIDE 50 MG: 50 INJECTION INTRAMUSCULAR; INTRAVENOUS at 12:04

## 2017-04-21 RX ADMIN — ALTEPLASE 4 MG: 2.2 INJECTION, POWDER, LYOPHILIZED, FOR SOLUTION INTRAVENOUS at 11:04

## 2017-04-21 NOTE — PROCEDURES
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 Patient is minimally verbal and is wheelchair bound. Care givers are present throughout procedure. Patient will be exchanged 5 units C, E, K antigen matched, LR, sickle negative, B+ blood. Goal exchange target is to increase post-exchange hct to 30%.    Sickle cell disease, non-acute, carries a Category I Grade 1B indication for RBC exchange for stroke prophylaxis/iron overload prevention via the 2016 Journal of Clinical Apheresis Guidelines (Ab J et al. Journal of Clinical Apheresis 2016; 31:149-162.) During long-term therapy, targeting a pre-transfusion threshold of 50% HbS may be as effective as 30% HbS.    Pt complained of pushing chest pain soon after procedure started.  Procedure paused.  Vitals taken (, /101) and rapid response called.  EKG found sinus tachycardia with increasing HR to 144 bpm when exchange re-initiated.  Procedure aborted.  Dr. Hanson at bedside, requested patient be transported to emergency room.  Pt escorted to ED in personal wheelchair with family and nursing staff.    Pertinent Laboratory Data:   2/10 Post-procedural HgbS 12%  3/6 Pre-procedural HgbS 42%  4/17 Pre-procedural HbS - not yet resulted    Complete Blood Count:   Lab Results   Component Value Date    HGB 8.3 (L) 04/21/2017    HCT 24.4 (L) 04/21/2017     04/21/2017    WBC 11.11 04/21/2017      Ref. Range 11/30/2016 09:12 2/10/2017 07:20 3/6/2017 07:48 4/21/2017 07:30   Retic Latest Ref Range: 0.5 - 2.5 % 5.3  (H) 8.5 (H) 11.8 (H) 16.4 (H)     Results for LUIS SHAH (MRN [id number]) as of 4/21/2017 12:56   Ref. Range 4/21/2017 07:30   Sodium Latest Ref Range: 136 - 145 mmol/L 152 (H)   Potassium Latest Ref Range: 3.5 - 5.1 mmol/L 4.3   Chloride Latest Ref Range: 95 - 110 mmol/L 118 (H)   CO2 Latest Ref Range: 23 - 29 mmol/L 24   Anion Gap Latest Ref Range: 8 - 16 mmol/L 10   BUN, Bld Latest Ref Range: 6 - 20 mg/dL 13   Creatinine Latest Ref Range: 0.5 - 1.4 mg/dL 0.7   eGFR if non African American Latest Ref Range: >60 mL/min/1.73 m^2 >60.0   eGFR if African American Latest Ref Range: >60 mL/min/1.73 m^2 >60.0   Glucose Latest Ref Range: 70 - 110 mg/dL 83   Calcium Latest Ref Range: 8.7 - 10.5 mg/dL 8.2 (L)   Alkaline Phosphatase Latest Ref Range: 55 - 135 U/L 229 (H)   Total Protein Latest Ref Range: 6.0 - 8.4 g/dL 8.3   Albumin Latest Ref Range: 3.5 - 5.2 g/dL 3.1 (L)   Total Bilirubin Latest Ref Range: 0.1 - 1.0 mg/dL 1.6 (H)   AST Latest Ref Range: 10 - 40 U/L 41 (H)   ALT Latest Ref Range: 10 - 44 U/L 23     Pertinent Medications:   Hydrea 500 mg daily   Folic acid 1 mg daily    Review of patient's allergies indicates:  No Known Allergies    Anesthesia: None     Technical Procedures Used: Red Blood Cell Exchange: <1 RBC unit transfused.  Procedure aborted.    Description of the Findings of the Procedure:   Please see Apheresis Nurse flowsheet for details.    The patient was evaluated and all clinical and laboratory data relevant to the treatment was reviewed.    I was available to the clinical staff throughout the procedure.    Significant Surgical Tasks Conducted by the Assistant(s): Not applicable  Complications: Procedure aborted.  See above.  Estimated Blood Loss (EBL): None  Implants: None   Specimens: None    ROBE Cha, PGY 2  Department of Pathology and Laboratory Medicine, [facility]  04/21/2017    ATTENDING MD ATTESTATION:  The apheresis procedure was performed under my supervision. I was  available throughout the procedure. The note has been edited, where and when necessary, to reflect my agreement.  I reviewed all clinical and laboratory data and reviewed the note written by Dr. Dumas and agree with the findings, assessment and plan.   The patient did not tolerate today's procedure due to chest pain, as above. She was discharged to the ER for an EKG.     Dory Meyer MD, PhD  Section of Transfusion Medicine & Histocompatibility  Department of Pathology and Laboratory Medicine  [facility]  [phone] (HLA & Blood Bank Offices)  04/21/2017

## 2017-04-21 NOTE — PROGRESS NOTES
HPI     Ocular health exam   ARNAV pt sister thinks today is her first eye exam   Ms.. [place] is here today to establish ocular health care brought in by   sister and brother.  Pt sister sts does not complain about vision nor ever worn glasses before.  Also does not squint when looking at a distance does not read or do any   near work.  (-)Flashes (-)Floaters  (-) OTC Drops  (?)Photophobia  (?)Glare  (-)dm or HTN  No eye sx or injuries   FamOhx: Unknown   Stroke 20 yrs ago no complications with eyes that sister knows of       Last edited by Nicole Sharpe on 4/21/2017  8:08 AM.         Assessment /Plan     For exam results, see Encounter Report.    Hb-SS disease without crisis  -     Ambulatory Referral to Ophthalmology  -No retinopathy noted, difficult views on 20D and direct  -Monitor at annual    History of CVA with residual deficit  -     Ambulatory Referral to Ophthalmology  -Full range of motion no apparent field defect.  No obvious visual impact    Hyperopia, bilateral  -Small hyperopic Rx not necessary      RTC 1 yr

## 2017-04-21 NOTE — PROGRESS NOTES
SECTION OF HEMATOLOGY AND BONE MARROW TRANSPLANT  Return  Patient Visit   2017  Referred by:  No ref. provider found  Referred for: SCD, exchange transfusion     CHIEF COMPLAINT:   No chief complaint on file.      HISTORY OF PRESENT ILLNESS:   27 yo female, non verbal due to multiple prior cva, sickle cell disease, referred 2016 for exchange transfusion.   She comes accompanied by her brother and his partner.  The patient's aunt was her previous full time caretaker but recently  and the brother and girlfriend took over care.  They do not know many details of her medical history.  Patient was previously seen at [facility] and apparently was getting monthly exchange transfusions via port but that transportation burden between [place] and [place] had become too much.  She recently established care with Dr. Song in [place] who placed the patient on hydrea.  Caretaker is unaware of any other specific sickle related complications.  The patient goes to the ED infrequently and it has been over a year since her last admission.       She presents today prior to her 3nd exchange transfusion at [facility].    Sister states since resumption of exchange transfusions she is more alert and has more energy. Eating better.  No other changes in clinical status.     PAST MEDICAL HISTORY:   Past Medical History:   Diagnosis Date    Anemia     Encounter for blood transfusion     Neuromuscular disorder     Seizures     Sickle cell anemia     Stroke        PAST SURGICAL HISTORY:   Past Surgical History:   Procedure Laterality Date    PORTACATH PLACEMENT         PAST SOCIAL HISTORY:   reports that she has never smoked. She does not have any smokeless tobacco history on file. She reports that she does not drink alcohol or use illicit drugs.    FAMILY HISTORY:  Family History   Problem Relation Age of Onset    Kidney failure Mother     No Known Problems Brother        CURRENT MEDICATIONS:   Current  Outpatient Prescriptions   Medication Sig    baclofen (LIORESAL) 10 MG tablet Take 1 tablet by mouth 3 (three) times daily as needed.    carbamazepine (CARBATROL) 200 MG CM12 Take 1 mg by mouth 4 (four) times daily.    clonazePAM (KLONOPIN) 0.5 MG tablet Take 1 tablet by mouth 2 (two) times daily.    folic acid (FOLVITE) 1 MG tablet Take 1 mg by mouth once daily.    hydrocodone-acetaminophen 7.5-325mg (NORCO) 7.5-325 mg per tablet Take 1 tablet by mouth 3 (three) times daily.    hydroxyurea (HYDREA) 500 mg Cap Take 500 mg by mouth once daily.    levetiracetam (KEPPRA) 1000 MG tablet Take 1 tablet by mouth 2 (two) times daily.    senna-docusate 8.6-50 mg (SENNA WITH DOCUSATE SODIUM) 8.6-50 mg per tablet Take 1 tablet by mouth once daily.     No current facility-administered medications for this visit.      ALLERGIES:   Review of patient's allergies indicates:  No Known Allergies          REVIEW OF SYSTEMS:   Unable to obtain ROS due to mental status      PHYSICAL EXAM:   Vitals:    04/21/17 0923   BP: 95/69   Pulse: 107       General - non verbal, contractured wheelchair bound, thin, non verbal ; appears comfortable  Head & Face - no sinus tenderness  Eyes - normal conjunctivae and lids   ENT - normal external auditory canals and tympanic membranes bilaterally oropharynx clear,  Normal dentition and gums  Neck - normal thyroid  Chest and Lung - normal respiratory effort, clear to auscultation bilaterally   Cardiovascular - RRR with no MGR, normal S1 and S2; no pedal edema  Abdomen -  soft, nontender, no palpable hepatomegaly or splenomegaly  Lymph - no palpable lymphadenopathy  Extremities - unremarkable nails and digits  Heme - no bruising, petechiae, pallor  Skin - no rashes or lesion      ECOG Performance Status: (foot note - ECOG PS provided by Eastern Cooperative Oncology Group) 3 - Symptomatic, >50% confined to bed    Karnofsky Performance Score:  40%- Disabled: Requires Special Care and  Assistance  DATA:   Lab Results   Component Value Date    WBC 10.42 04/21/2017    HGB 7.3 (L) 04/21/2017    HCT 22.0 (L) 04/21/2017     (H) 04/21/2017     04/21/2017     Gran #   Date Value Ref Range Status   02/10/2017 Test Not Performed 1.8 - 7.7 K/uL Corrected     Comment:     Corrected result; previously reported as 2.8 on 02/10/2017 at 09:14.     Gran%   Date Value Ref Range Status   04/21/2017 49.0 38.0 - 73.0 % Final     CMP  Sodium   Date Value Ref Range Status   04/21/2017 149 (H) 136 - 145 mmol/L Final     Potassium   Date Value Ref Range Status   04/21/2017 4.2 3.5 - 5.1 mmol/L Final     Chloride   Date Value Ref Range Status   04/21/2017 115 (H) 95 - 110 mmol/L Final     CO2   Date Value Ref Range Status   04/21/2017 27 23 - 29 mmol/L Final     Glucose   Date Value Ref Range Status   04/21/2017 94 70 - 110 mg/dL Final     BUN, Bld   Date Value Ref Range Status   04/21/2017 11 6 - 20 mg/dL Final     Creatinine   Date Value Ref Range Status   04/21/2017 0.7 0.5 - 1.4 mg/dL Final     Calcium   Date Value Ref Range Status   04/21/2017 7.9 (L) 8.7 - 10.5 mg/dL Final     Total Protein   Date Value Ref Range Status   04/21/2017 7.7 6.0 - 8.4 g/dL Final     Albumin   Date Value Ref Range Status   04/21/2017 2.9 (L) 3.5 - 5.2 g/dL Final     Total Bilirubin   Date Value Ref Range Status   04/21/2017 1.4 (H) 0.1 - 1.0 mg/dL Final     Comment:     For infants and newborns, interpretation of results should be based  on gestational age, weight and in agreement with clinical  observations.  Premature Infant recommended reference ranges:  Up to 24 hours.............<8.0 mg/dL  Up to 48 hours............<12.0 mg/dL  3-5 days..................<15.0 mg/dL  6-29 days.................<15.0 mg/dL       Alkaline Phosphatase   Date Value Ref Range Status   04/21/2017 214 (H) 55 - 135 U/L Final     AST   Date Value Ref Range Status   04/21/2017 45 (H) 10 - 40 U/L Final     ALT   Date Value Ref Range Status    04/21/2017 24 10 - 44 U/L Final     Anion Gap   Date Value Ref Range Status   04/21/2017 7 (L) 8 - 16 mmol/L Final     eGFR if    Date Value Ref Range Status   04/21/2017 >60.0 >60 mL/min/1.73 m^2 Final     eGFR if non    Date Value Ref Range Status   04/21/2017 >60.0 >60 mL/min/1.73 m^2 Final     Comment:     Calculation used to obtain the estimated glomerular filtration  rate (eGFR) is the CKD-EPI equation. Since race is unknown   in our information system, the eGFR values for   -American and Non--American patients are given   for each creatinine result.       Lab Results   Component Value Date    FERRITIN 3029 (H) 04/21/2017   Results for LUIS SHAH (MRN [id number]) as of 12/1/2016 06:37   Ref. Range 11/30/2016 09:12   Retic Latest Ref Range: 0.5 - 2.5 % 5.3 (H)     Basket      Reviewed  Result Note  View in In Basket        MyChart Results Release      MyChart Status: Inactive           Hemoglobin Electrophoresis,Hgb A2 Nate.   Status:  Final result   Visible to patient:  No (Not Released) Next appt:  None Dx:  Hb-SS disease with crisis Order: [id number]           Ref Range & Units 1d ago   3wk ago   3mo ago      Hgb A2 Quant 2.2 - 3.2 % 3.4 (H)  2.7    Hemoglobin Bands  Hb A and Hb S Hb F and Hb A2  Hb S , Hb F , Hb A2    Hemoglobin Electrophoresis Interp  See comment See commentCM See commentCM   Comments: Interpreted by Vanessa Hatch M.D.   There are prominent bands in the A and S positions,   measuring approximately 45 % and 42 % respectively.  There is also a   hemoglobin F band of approximately 8%.  This   pattern is consistent with the patient history of sickle cell anemia   and recent RBC transfusion.              TTE - 3/6/17  CONCLUSIONS     1 - Normal left ventricular systolic function (EF 55-60%).     2 - Normal left ventricular diastolic function.     3 - Normal right ventricular systolic function .  ASSESSMENT AND PLAN:   Encounter Diagnosis    Name Primary?   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     -history of SCD with at least multiple CVA now nonverbal and wheelchair bound  -accompanying caretakers unclear of her specific medical history; she was receiving monthly exchange transfusions [facility] with interruption due to social issues; [place] [facility] [place]  -recommend continue hydrea 500mg daily; dose not appear to have frequent pain crisis  -continue folic acid daily  -continue al other current medications  - secondary prophylactic monthly exchange transfusions are indicated to prevent further CVA with goal  To keep hgb S <30%; hgb S significant improved with first transfusion, proceed with 3nd today   -discussed iron chelation at length with her family and they do not want to proceed given risks and C       Sickle Cell Disease Monitoring   1)Hydrea  -as above   2)Iron Overload  -as above; she has no over evidence of end organ damage from iron overload  3)AVN  -GORAN  4)LE Ulcerations  -GORAN  5)HTN  -normotensive today  6)Opthalmic  -see by [facility];  Next appt due April 2018  7)Pain  -appears comfortable today  -continue prn norco  8)CardioPulmonary  - baseline TTE today with no major abnormality ; next due march 2019    9)Renal  -obtain urine pr/cr at next appt    10)Neuro/CVA  -continue keppra for prior seizure  -exchange transfusiosn as above    11)Vaccines   -will need to readminister vaccines and document at [facility]; scheduled for 5/19  - HIB series, menactra, prevnar followed by pneumovax after 8 weeks     12)Contraception  -NA      Follow Up:  Cbc, cmp, type and screen, hemoglobin EP, vaccines, and MD appt and exchange transfusion on 5/19  Jacob Hanson MD  Hematology/Oncology/Bone Marrow Transplant      Addendum - same day of appt during exchange transfusion patient noted to have chest pain at initiation of transfusiosn; rapid reponse called; pain resolved but rhythm strip showed tachyarrhythmia with rate  in the 130's-140's so transfusion stopped and patient sent to ED.  FU as above

## 2017-04-21 NOTE — PROGRESS NOTES
Pt presents to outpatient apheresis dept in a wheelchair for a red blood cell exchange, she is accompanied by her sister and brother.  She is oriented x4 but is limited with verbal communication due to previous strokes.  Mid chest vortex port successfully accessed with 2, 17 gauge needles.  Cathflo instilled into both sides at 1130, withdrew with ease at 1243.  50 mg of benadryl given IVP as a premed for RBC's.  Pt states she has no pain.  Replacement fluids pRBC's.  Exchange started at 1246 without difficulty.  Five minutes into procedure pt began to complain of chest pain, she is unable to describe pain but answers yes to feels like needles sticking her and pressure, tx paused.  Dr Meyer at bedside and made a decision to call rapid response to obtain a EKG, pt in normal sinus rhythm tx resumed while connected to monitor and immediately pt became tachycardic and stated her chest hurt again.  Dr Hanson's office notified, Dr Hanson immediately came to the bedside and requested to abort tx and send pt to the ER for further evaluation.  Port flushed and locked.  Pt tolerated tx well.  Next tx will be scheduled by MD office.  Pt was escorted to ER by SKYE Karimi from the Rapid response team, her sister and brother and myself.  Report given to ER triage nurse.

## 2017-04-21 NOTE — ED NOTES
Patient identifiers verified and correct for Lan Oscar.    LOC: The patient is awake, alert and aware of environment with an appropriate affect, the patient is oriented x 3 and speaking appropriately.  APPEARANCE: Patient resting comfortably and in no acute distress, patient is clean and well groomed, patient's clothing is properly fastened.  SKIN: The skin is warm and dry, color consistent with ethnicity, patient has normal skin turgor and moist mucus membranes, skin intact, no breakdown or bruising noted.  RESPIRATORY: Airway is open and patent, respirations are spontaneous, patient has a normal effort and rate, no accessory muscle use noted, bilateral breath sounds clear and present.  CARDIAC: Patient has a normal rate and regular rhythm, no periphreal edema noted, capillary refill < 3 seconds.  ABDOMEN: Soft and non tender to palpation, no distention noted, normoactive bowel sounds present in all four quadrants.  NEUROLOGIC: PERRL, 3 mm bilaterally, eyes open spontaneously,affect flat, she follows simple commands, facial expression symmetrical, bilateral upper extremites contracted.

## 2017-04-21 NOTE — Clinical Note
Cbc, cmp, type and screen, hemoglobin EP, vaccines, and MD appt and resume exchange transfusions on 5/19

## 2017-04-21 NOTE — ED PROVIDER NOTES
Encounter Date: 4/21/2017    SCRIBE #1 NOTE: I, Araceli Dumont, am scribing for, and in the presence of, Dr. Barragan.       History     Chief Complaint   Patient presents with    Chest Pain     Pt began c/o chest pain while having blood transfusion today.      Review of patient's allergies indicates:  No Known Allergies  HPI Comments: Time patient was seen by the provider: 3:10 PM      The patient is a 27 y.o. female with a hx of CVA and sickle cell anemia who presents to the ED complaining of CP which started around 12 PM while pt was receiving an exchange transfusion in the Hematology clinic. Pain is described as a pressure. Sx were relieved five minutes after the tx was stopped, however pt currently endorses some chest pressure. No further concerns or complains at this time.      The history is provided by a relative, medical records and the patient. History limited by: Residual cognitive impairment due to previous CVA.     Past Medical History:   Diagnosis Date    Anemia     Encounter for blood transfusion     Neuromuscular disorder     Seizures     Sickle cell anemia     Stroke      Past Surgical History:   Procedure Laterality Date    PORTACATH PLACEMENT       Family History   Problem Relation Age of Onset    Kidney failure Mother     No Known Problems Brother      Social History   Substance Use Topics    Smoking status: Never Smoker    Smokeless tobacco: None    Alcohol use No     Review of Systems   Unable to perform ROS: Other (Residual cognitive impairment secondary to previous CVA)       Physical Exam   Initial Vitals   BP Pulse Resp Temp SpO2   04/21/17 1400 04/21/17 1400 04/21/17 1400 04/21/17 1400 04/21/17 1400   104/74 110 24 98.3 °F (36.8 °C) 96 %     Physical Exam    Nursing note and vitals reviewed.  Constitutional:   Chronically ill appearing and cachectic, is in a wheelchair   HENT:   Head: Normocephalic and atraumatic.   Mucous membranes appear moist   Eyes: EOM are normal. Pupils  are equal, round, and reactive to light.   No scleral icterus   Neck: Neck supple.   Cardiovascular: Normal rate, regular rhythm and normal heart sounds.   No murmur heard.  Pulmonary/Chest: Breath sounds normal. No respiratory distress. She has no wheezes. She has no rales.   Abdominal: Soft. She exhibits no distension. There is no tenderness.   Musculoskeletal: She exhibits no edema (-) extremity edema.   Neurological: She is alert.   Alert and able to answer simple questions but needs a lot of prompting. Extremities are contracted   Skin: Skin is warm and dry. No rash noted.   Psychiatric: She has a normal mood and affect. Thought content normal.         ED Course   Procedures  Labs Reviewed   CBC W/ AUTO DIFFERENTIAL - Abnormal; Notable for the following:        Result Value    RBC 1.99 (*)     Hemoglobin 7.3 (*)     Hematocrit 22.0 (*)      (*)     MCH 36.7 (*)     RDW 27.4 (*)     nRBC 62@L=100 (*)     Mono% 16.0 (*)     Sickle Cells Occasional (*)     All other components within normal limits   COMPREHENSIVE METABOLIC PANEL - Abnormal; Notable for the following:     Sodium 149 (*)     Chloride 115 (*)     Calcium 7.9 (*)     Albumin 2.9 (*)     Total Bilirubin 1.4 (*)     Alkaline Phosphatase 214 (*)     AST 45 (*)     Anion Gap 7 (*)     All other components within normal limits   TROPONIN I   PROTIME-INR     EKG Readings: (Independently Interpreted)   T wave inversions in V1-V3. No old EKG for comparison       X-Rays:   Independently Interpreted Readings:   Chest X-Ray: Mild interstitial prominence.     Medical Decision Making:   History:   Old Medical Records: I decided to obtain old medical records.  Old Records Summarized: other records.       <> Summary of Records: Pt with hx of CVA, significant cognitive impairment secondary to a previous CVA, sickle cell disease and SZ disorder. Seen today for an exchange transfusion in the hematology clinic.   Initial Assessment:   28 yo f, h/o sickle cell  disease with severe cognitive/functional impairment 2/2 CVA, s/p exchange transfusion today in hematology clinic, developed CP during initial transfusion, ? SOB.  Pt has very limited capability in terms of communication so can say that she had CP (and ? Has CP now) but can't provide any further details.  Well-appearing in ED, does not appear SOB, mild tachycardia, but lungs clear.  Differential Diagnosis:   Reaction to transfusion like TRALI?   Independently Interpreted Test(s):   I have ordered and independently interpreted X-rays - see prior notes.  I have ordered and independently interpreted EKG Reading(s) - see prior notes  Clinical Tests:   Lab Tests: Ordered and Reviewed  Radiological Study: Ordered and Reviewed  Medical Tests: Ordered and Reviewed  ED Management:  -labs  -CXR  -will d/w BMT fellow  Other:   I have discussed this case with another health care provider.            Scribe Attestation:   Scribe #1: I performed the above scribed service and the documentation accurately describes the services I performed. I attest to the accuracy of the note.    Attending Attestation:           Physician Attestation for Scribe:  Physician Attestation Statement for Scribe #1: I, Dr. Barragan, reviewed documentation, as scribed by Araceli Dumont in my presence, and it is both accurate and complete.                 ED Course     5:22 PM  Discussed with BMT fellow.  She will d/w attending and call me back with recs    5:33 PM  Discussed with BMT fellow.  Says that even if pt has TRALI, care would be supportive and if pt not hypoxic, would be safe for d/c home with f/u in hematology clinic next week.  Pt remains tachycardic but review of EMR reveals that pt typically tachy so this seems to be baseline for her. Family comfortable with the plan.    Clinical Impression:   The primary encounter diagnosis was TRALI (transfusion related acute lung injury). A diagnosis of Chest pain was also pertinent to this  visit.    Disposition:   Disposition: Discharged  Condition: Stable       Sanna Barragan MD  05/04/17 9772

## 2017-04-21 NOTE — MR AVS SNAPSHOT
[facility]  [address]  [facility] [address]  Phone: [phone]  Fax: [phone]                  Lan Oscar   2017 8:30 AM   Initial consult    Description:  Female : 1990   Provider:  Cameron Hall OD   Department:  [facility]           Reason for Visit     Concerns About Ocular Health           Diagnoses this Visit        Comments    Hb-SS disease without crisis    -  Primary     History of CVA with residual deficit         Hyperopia, bilateral                To Do List           Future Appointments        Provider Department Dept Phone    2017 8:30 AM Cameron Hall OD [facility] [phone]    2017 9:20 AM MD Dat [facility] [phone]    2017 10:10 AM INJECTION, INFECTIOUS DISEASES [facility] ID Injection Room [phone]    2017 11:00 AM APHERESIS, [address] [facility] [phone]    2017 10:00 AM INJECTION, INFECTIOUS DISEASES [facility] Injection Room [phone]      Goals (5 Years of Data)     None      Follow-Up and Disposition     Return in about 1 year (around 2018).      [facility]     [facility] Nurse Care Line -  Assistance  Unless otherwise directed by your provider, please contact [facility], our nurse care line that is available for  assistance.     Registered nurses in the [facility] provide: appointment scheduling, clinical advisement, health education, and other advisory services.  Call: [phone] (toll free)               Medications           Message regarding Medications     Verify the changes and/or additions to your medication regime listed below are the same as discussed with your clinician today.  If any of these changes or additions are incorrect, please notify your healthcare provider.             Verify that the below list of medications is an accurate representation of the medications you  are currently taking.  If none reported, the list may be blank. If incorrect, please contact your healthcare provider. Carry this list with you in case of emergency.           Current Medications     baclofen (LIORESAL) 10 MG tablet Take 1 tablet by mouth 3 (three) times daily as needed.    carbamazepine (CARBATROL) 200 MG CM12 Take 1 mg by mouth 4 (four) times daily.    clonazePAM (KLONOPIN) 0.5 MG tablet Take 1 tablet by mouth 2 (two) times daily.    folic acid (FOLVITE) 1 MG tablet Take 1 mg by mouth once daily.    hydrocodone-acetaminophen 7.5-325mg (NORCO) 7.5-325 mg per tablet Take 1 tablet by mouth 3 (three) times daily.    hydroxyurea (HYDREA) 500 mg Cap Take 500 mg by mouth once daily.    levetiracetam (KEPPRA) 1000 MG tablet Take 1 tablet by mouth 2 (two) times daily.    senna-docusate 8.6-50 mg (SENNA WITH DOCUSATE SODIUM) 8.6-50 mg per tablet Take 1 tablet by mouth once daily.           Clinical Reference Information           Allergies as of 4/21/2017     No Known Allergies      Immunizations Administered on Date of Encounter - 4/21/2017     None      Orders Placed During Today's Visit      Normal Orders This Visit    Ambulatory Referral to Ophthalmology       [facility] Sign-Up     Activating your [facility] account is as easy as 1-2-3!     1) Visit [email or web address], select Sign Up Now, enter this activation code and your date of birth, then select Next.  [email or web address]  Expires: 6/5/2017  8:25 AM      2) Create a username and password to use when you visit [facility] in the future and select a security question in case you lose your password and select Next.    3) Enter your e-mail address and click Sign Up!    Additional Information  If you have questions, please e-mail [email or web address] or call [phone] to talk to our [facility] staff. Remember, [facility] is NOT to be used for urgent needs. For medical emergencies, dial 911.         Language Assistance Services     ATTENTION: Language  assistance services are available, free of charge. Please call [phone].      ATENCIÓN: Si habla zenon, tiene a napier disposición servicios gratuitos de asistencia lingüística. Llame al [phone].     CHÚ Ý: N?u b?n nói Ti?ng Vi?t, có các d?ch v? h? tr? ngôn ng? mi?n phí dành cho b?n. G?i s? [phone].         [facility] complies with applicable Federal civil rights laws and does not discriminate on the basis of race, color, national origin, age, disability, or sex.

## 2017-04-21 NOTE — ED AVS SNAPSHOT
[facility]  [address]  [facility] [address]               Lan Oscar   2017  2:46 PM   ED    Description:  Female : 1990   Department:  [facility]           Your Care was Coordinated By:     Provider Role From To    Sanna Barragan MD Attending Provider 17 1451 --      Reason for Visit     Chest Pain           Diagnoses this Visit        Comments    TRALI (transfusion related acute lung injury)    -  Primary     Chest pain           ED Disposition     None           To Do List           Follow-up Information     Schedule an appointment as soon as possible for a visit with Dudley Hanson MD.    Specialty:  Hematology and Oncology    Contact information:    [address]  [facility] [address]  [phone]        [facility]     [facility] -  Assistance  Unless otherwise directed by your provider, please contact [facility], our nurse care line that is available for  assistance.     Registered nurses in the [facility] provide: appointment scheduling, clinical advisement, health education, and other advisory services.  Call: [phone] (toll free)               Medications           Message regarding Medications     Verify the changes and/or additions to your medication regime listed below are the same as discussed with your clinician today.  If any of these changes or additions are incorrect, please notify your healthcare provider.             Verify that the below list of medications is an accurate representation of the medications you are currently taking.  If none reported, the list may be blank. If incorrect, please contact your healthcare provider. Carry this list with you in case of emergency.           Current Medications     baclofen (LIORESAL) 10 MG tablet Take 1 tablet by mouth 3 (three) times daily as needed.    carbamazepine (CARBATROL) 200 MG CM12 Take 1  mg by mouth 4 (four) times daily.    clonazePAM (KLONOPIN) 0.5 MG tablet Take 1 tablet by mouth 2 (two) times daily.    folic acid (FOLVITE) 1 MG tablet Take 1 mg by mouth once daily.    hydrocodone-acetaminophen 7.5-325mg (NORCO) 7.5-325 mg per tablet Take 1 tablet by mouth 3 (three) times daily.    hydroxyurea (HYDREA) 500 mg Cap Take 500 mg by mouth once daily.    levetiracetam (KEPPRA) 1000 MG tablet Take 1 tablet by mouth 2 (two) times daily.    senna-docusate 8.6-50 mg (SENNA WITH DOCUSATE SODIUM) 8.6-50 mg per tablet Take 1 tablet by mouth once daily.           Clinical Reference Information           Your Vitals Were     BP Pulse Temp Resp Weight SpO2    93/64 113 98.3 °F (36.8 °C) 16 54.4 kg (120 lb) 100%    BMI                19.97 kg/m2          Allergies as of 4/21/2017     No Known Allergies      Immunizations Administered on Date of Encounter - 4/21/2017     None      ED Micro, Lab, POCT     Start Ordered       Status Ordering Provider    04/21/17 1516 04/21/17 1515  Troponin I  STAT      Final result     04/21/17 1516 04/21/17 1515  Protime-INR  STAT      Final result     04/21/17 1515 04/21/17 1515  CBC auto differential  STAT      Final result     04/21/17 1515 04/21/17 1515  Comprehensive metabolic panel  STAT      Final result       ED Imaging Orders     Start Ordered       Status Ordering Provider    04/21/17 1516 04/21/17 1515  X-Ray Chest 1 View  1 time imaging      Final result         Discharge Instructions         Uncertain Causes of Chest Pain    Chest pain can happen for a number of reasons. Sometimes the cause can't be determined. If your condition does not seem serious, and your pain does not appear to be coming from your heart, your healthcare provider may recommend watching it closely. Sometimes the signs of a serious problem take more time to appear. Many problems not related to your heart can cause chest pain.These include:  · Musculoskeletal. Costochondritis, an inflammation of the  tissues around the ribs that can occur from trauma or overuse injuries  · Respiratory. Pneumonia, pneumothorax, or pneumonitis (inflammation of the lining of the chest and lungs)  · Gastrointestinal. Esophageal reflux, heartburn, or gallbladder disease  · Anxiety and panic disorders  · Nerve compression and neuritis  · Miscellaneous problems such as aortic aneurysm or pulmonary embolism (a blood clot in the lungs)  Home care  After your visit, follow these recommendations:  · Rest today and avoid strenuous activity.  · Take any prescribed medicine as directed.  · Be aware of any recurrent chest pain and notice any changes  Follow-up care  Follow up with your healthcare provider if you do not start to feel better within 24 hours, or as advised.  Call 911  Call 911 if any of these occur:  · A change in the type of pain: if it feels different, becomes more severe, lasts longer, or begins to spread into your shoulder, arm, neck, jaw or back  · Shortness of breath or increased pain with breathing  · Weakness, dizziness, or fainting  · Rapid heart beat  · Crushing sensation in your chest  When to seek medical advice  Call your healthcare provider right away if any of the following occur:  · Cough with dark colored sputum (phlegm) or blood  · Fever of 100.4ºF (38ºC) or higher, or as directed by your healthcare provider  · Swelling, pain or redness in one leg  · Shortness of breath  Date Last Reviewed: 12/30/2015  © 0537-6555 [employer]. [address] [place] [address]. All rights reserved. This information is not intended as a substitute for professional medical care. Always follow your healthcare professional's instructions.          Your Scheduled Appointments     May 19, 2017 10:00 AM CDT   Immunization/Injection with INJECTION, INFECTIOUS DISEASES   [facility]- ID Injection Room ([facility] )    [address]  [facility] [address] [phone]              [facility] Sign-Up      Activating your [facility] account is as easy as 1-2-3!     1) Visit [email or web address], select Sign Up Now, enter this activation code and your date of birth, then select Next.  [email or web address]  Expires: 6/5/2017  8:25 AM      2) Create a username and password to use when you visit [facility] in the future and select a security question in case you lose your password and select Next.    3) Enter your e-mail address and click Sign Up!    Additional Information  If you have questions, please e-mail [email or web address] or call [phone] to talk to our [facility] staff. Remember, [facility] is NOT to be used for urgent needs. For medical emergencies, dial 911.          [facility] complies with applicable Federal civil rights laws and does not discriminate on the basis of race, color, national origin, age, disability, or sex.        Language Assistance Services     ATTENTION: Language assistance services are available, free of charge. Please call [phone].      ATENCIÓN: Si habla español, tiene a napier disposición servicios gratuitos de asistencia lingüística. Llame al [phone].     CHÚ Ý: N?u b?n nói Ti?ng Vi?t, có các d?ch v? h? tr? ngôn ng? mi?n phí dành cho b?n. G?i s? [phone].

## 2017-04-21 NOTE — LETTER
April 21, 2017      Dudley Hanson MD  [address]  [facility] [address]           [facility]  [address]  [facility] [address]  Phone: [phone]  Fax: [phone]          Patient: Lan Oscar   MR Number: [id number]   YOB: 1990   Date of Visit: 4/21/2017       Dear Dr. Dudley Hanson:    Thank you for referring Lan Oscar to me for evaluation. Attached you will find relevant portions of my assessment and plan of care.    If you have questions, please do not hesitate to call me. I look forward to following Lan Oscar along with you.    Sincerely,    Cameron Hall, OD    Enclosure  CC:  No Recipients    If you would like to receive this communication electronically, please contact [email or web address] or [phone] to request more information on [employer] access.    For providers and/or their staff who would like to refer a patient to [facility], please contact us through our one-stop-shop provider referral line, [facility], at [phone].    If you feel you have received this communication in error or would no longer like to receive these types of communications, please e-mail [email or web address]

## 2017-04-21 NOTE — ED NOTES
Pt sent from infusion clinic. She complained of chest pain during exchange infusion. Family member reports infusion stopped immediately and chest pain resolved after 5 minutes.

## 2017-04-22 VITALS
SYSTOLIC BLOOD PRESSURE: 130 MMHG | WEIGHT: 120 LBS | HEIGHT: 65 IN | HEART RATE: 100 BPM | BODY MASS INDEX: 19.99 KG/M2 | DIASTOLIC BLOOD PRESSURE: 88 MMHG | RESPIRATION RATE: 18 BRPM | TEMPERATURE: 99 F

## 2017-05-01 ENCOUNTER — TELEPHONE (OUTPATIENT)
Dept: HEMATOLOGY/ONCOLOGY | Facility: CLINIC | Age: 27
End: 2017-05-01

## 2017-05-01 NOTE — TELEPHONE ENCOUNTER
----- Message from Elizabet Rudolph sent at 5/1/2017  1:19 PM CDT -----  Contact: Ms Madrigal/   sister [phone]  Patient has 3 appointments scheduled on 5/12/2017.   Pt would like all appointments scheduled  5/12/2017. please call  Ms Madrigal/   sister [phone]

## 2017-05-01 NOTE — TELEPHONE ENCOUNTER
----- Message from Casie Coley MA sent at 5/1/2017  2:16 PM CDT -----  Contact: Ms Madrigal/    [phone]  Come see me before you schedule.   ----- Message -----     From: Elizabet Rudolph     Sent: 5/1/2017   1:19 PM       To: Margie DOZIER Staff    Patient has 3 appointments scheduled on 5/12/2017.   Pt would like all appointments scheduled  5/12/2017. please call  Ms Madrigal/    [phone]

## 2017-05-10 ENCOUNTER — TELEPHONE (OUTPATIENT)
Dept: HEMATOLOGY/ONCOLOGY | Facility: CLINIC | Age: 27
End: 2017-05-10

## 2017-05-10 NOTE — TELEPHONE ENCOUNTER
----- Message from Trupti Han sent at 5/10/2017  3:29 PM CDT -----  Contact: Sister   I would prefer you to call her back and let her know they need to keep their apt, as she may ask why.     ----- Message -----     From: Indira Woodward RN     Sent: 5/10/2017   3:26 PM       To: Trupti Han        ----- Message -----     From: Dudley Hanson MD     Sent: 5/10/2017   3:22 PM       To: Indira Woodward RN    Needs to keep appt .  np can see her   ----- Message -----     From: Indira Woodward RN     Sent: 5/10/2017  10:26 AM       To: Dudley Hanson MD        ----- Message -----     From: Trupti Han     Sent: 5/10/2017   9:58 AM       To: Margie Henao    I explained to the pt's sister he is on service so she would have to keep the apt time that is scheduled. She asked if it would be okay to reschedule everything. Is this okay or should the patient keep her apt's for any reason ?    ----- Message -----     From: Jennifer Gomez     Sent: 5/10/2017   8:53 AM       To: Marie Rinaldi    Wants to see Margie before 140pm, preferably around 11am. Please call her sister [phone]

## 2017-05-11 PROCEDURE — 86920 COMPATIBILITY TEST SPIN: CPT

## 2017-05-12 ENCOUNTER — HOSPITAL ENCOUNTER (OUTPATIENT)
Dept: TRANSFUSION MEDICINE | Facility: HOSPITAL | Age: 27
Discharge: HOME | End: 2017-05-12
Attending: INTERNAL MEDICINE
Payer: MEDICARE

## 2017-05-12 ENCOUNTER — OFFICE VISIT (OUTPATIENT)
Dept: HEMATOLOGY/ONCOLOGY | Facility: CLINIC | Age: 27
End: 2017-05-12
Payer: MEDICARE

## 2017-05-12 VITALS — DIASTOLIC BLOOD PRESSURE: 67 MMHG | HEART RATE: 99 BPM | SYSTOLIC BLOOD PRESSURE: 98 MMHG

## 2017-05-12 VITALS
WEIGHT: 119 LBS | BODY MASS INDEX: 19.83 KG/M2 | HEIGHT: 65 IN | TEMPERATURE: 97 F | SYSTOLIC BLOOD PRESSURE: 103 MMHG | DIASTOLIC BLOOD PRESSURE: 73 MMHG | HEART RATE: 101 BPM | RESPIRATION RATE: 18 BRPM

## 2017-05-12 DIAGNOSIS — D57.1 HB-SS DISEASE WITHOUT CRISIS: ICD-10-CM

## 2017-05-12 DIAGNOSIS — Z92.89 HISTORY OF EXCHANGE TRANSFUSION: Primary | ICD-10-CM

## 2017-05-12 DIAGNOSIS — D57.1 HB-SS DISEASE WITHOUT CRISIS: Primary | ICD-10-CM

## 2017-05-12 LAB
BLD PROD TYP BPU: NORMAL
BLOOD UNIT EXPIRATION DATE: NORMAL
BLOOD UNIT TYPE CODE: 5100
BLOOD UNIT TYPE CODE: 7300
BLOOD UNIT TYPE: NORMAL
CODING SYSTEM: NORMAL
DISPENSE STATUS: NORMAL
NUM UNITS TRANS PACKED RBC: NORMAL

## 2017-05-12 PROCEDURE — 96374 THER/PROPH/DIAG INJ IV PUSH: CPT

## 2017-05-12 PROCEDURE — 36415 COLL VENOUS BLD VENIPUNCTURE: CPT

## 2017-05-12 PROCEDURE — 63600175 PHARM REV CODE 636 W HCPCS: Performed by: PATHOLOGY

## 2017-05-12 PROCEDURE — 86902 BLOOD TYPE ANTIGEN DONOR EA: CPT

## 2017-05-12 PROCEDURE — 96365 THER/PROPH/DIAG IV INF INIT: CPT

## 2017-05-12 PROCEDURE — 99214 OFFICE O/P EST MOD 30 MIN: CPT | Mod: S$PBB,,, | Performed by: INTERNAL MEDICINE

## 2017-05-12 PROCEDURE — 36512 APHERESIS RBC: CPT | Mod: ,,, | Performed by: PATHOLOGY

## 2017-05-12 PROCEDURE — P9016 RBC LEUKOCYTES REDUCED: HCPCS

## 2017-05-12 PROCEDURE — 36512 APHERESIS RBC: CPT

## 2017-05-12 PROCEDURE — 83020 HEMOGLOBIN ELECTROPHORESIS: CPT

## 2017-05-12 PROCEDURE — 25000003 PHARM REV CODE 250: Performed by: PATHOLOGY

## 2017-05-12 PROCEDURE — 96366 THER/PROPH/DIAG IV INF ADDON: CPT

## 2017-05-12 PROCEDURE — 86920 COMPATIBILITY TEST SPIN: CPT

## 2017-05-12 PROCEDURE — 36593 DECLOT VASCULAR DEVICE: CPT

## 2017-05-12 PROCEDURE — 99999 PR PBB SHADOW E&M-EST. PATIENT-LVL II: CPT | Mod: PBBFAC,,, | Performed by: INTERNAL MEDICINE

## 2017-05-12 RX ORDER — HEPARIN SODIUM 1000 [USP'U]/ML
4400 INJECTION, SOLUTION INTRAVENOUS; SUBCUTANEOUS ONCE
Status: COMPLETED | OUTPATIENT
Start: 2017-05-12 | End: 2017-05-12

## 2017-05-12 RX ORDER — DIPHENHYDRAMINE HYDROCHLORIDE 50 MG/ML
25 INJECTION INTRAMUSCULAR; INTRAVENOUS ONCE
Status: COMPLETED | OUTPATIENT
Start: 2017-05-12 | End: 2017-05-12

## 2017-05-12 RX ADMIN — ALTEPLASE 4 MG: 2.2 INJECTION, POWDER, LYOPHILIZED, FOR SOLUTION INTRAVENOUS at 11:05

## 2017-05-12 RX ADMIN — HEPARIN SODIUM 4400 UNITS: 1000 INJECTION, SOLUTION INTRAVENOUS; SUBCUTANEOUS at 02:05

## 2017-05-12 RX ADMIN — DIPHENHYDRAMINE HYDROCHLORIDE 25 MG: 50 INJECTION INTRAMUSCULAR; INTRAVENOUS at 12:05

## 2017-05-12 NOTE — PROGRESS NOTES
Pt presented to outpatient apheresis dept in a wheelchair for a RBC exchange, accompanied by her sister and grandmother.  Mid chest vortex port accessed successfully with 2, 17 gauge needles prior to tx.  Cathflo instilled into both sides of port at 1100, withdrawn with ease at 1215.  Red cell exchange started at 1216 without difficulty.  Pt oriented x4.  Replacement fluids 5 units of pRBC's.  Pt nods head no when asked if she has pain.  25 mg of benadryl given IVP prior to start of pRBC's.  Tx ended at 1356.  Port flushed and locked.  Pt tolerated tx well.  Dr Hanson's office will call to schedule next appointment.  Pt exited dept in her wheelchair, accompanied by her sister and grandmother.

## 2017-05-12 NOTE — Clinical Note
Cbc, cmp, type and screen, hemoglobin EP, vaccines, and MD appt and exchange transfusion in one month -please move the 5/19 vaccines to same day her next appt as they are travelling from far away

## 2017-05-12 NOTE — PROCEDURES
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 Patient is minimally verbal and is wheelchair bound. Care givers are present throughout procedure. Patient will be exchanged 5 units C, E, K antigen matched, LR, sickle negative, B+ blood. Goal exchange target is to increase post-exchange hct to 30%. Cathflo instillation with 1h wait time was required for venous access through her port     Sickle cell disease, non-acute, carries a Category I Grade 1B indication for RBC exchange for stroke prophylaxis/iron overload prevention via the 2016 Journal of Clinical Apheresis Guidelines (Berger J et al. Journal of Clinical Apheresis 2016; 31:149-162.)     Pertinent Laboratory Data:   Complete Blood Count:   Lab Results   Component Value Date    HGB 8.1 (L) 05/12/2017    HCT 23.6 (L) 05/12/2017     05/12/2017    WBC 10.42 04/21/2017       Pertinent Medications:   Hydrea 500 mg daily   Folic acid 1 mg daily    Review of patient's allergies indicates:  No Known Allergies    Anesthesia: None     Technical Procedures Used: Red Blood Cell Exchange: Approximately 5 units of packed red blood cells were exchanged.    Description of the Findings of the Procedure:   Please see Apheresis Nurse flowsheet for details.    The patient was evaluated and all clinical and laboratory data relevant to the treatment was reviewed, and a decision was made to proceed with the Apheresis procedure.    I was available to the clinical staff throughout the procedure.    Significant Surgical  Tasks Conducted by the Assistant(s): Not applicable  Complications: None  Estimated Blood Loss (EBL): None  Implants: None   Specimens: None    ATTENDING MD ATTESTATION:  The apheresis procedure was performed under my supervision. I was available throughout the procedure. The note has been edited, where and when necessary, to reflect my agreement.  I reviewed all clinical and laboratory data and reviewed the note written by Dr. Soriano and agree with the findings, assessment and plan.   The patient tolerated the procedure without complications and was discharged to home in good condition. The next scheduled procedure will be scheduled at a later date.     Dory Meyer MD, PhD  Section of Transfusion Medicine & Histocompatibility  Department of Pathology and Laboratory Medicine  [facility]  [phone] (HLA & Blood Bank Offices)  05/12/2017

## 2017-05-12 NOTE — PROGRESS NOTES
SECTION OF HEMATOLOGY AND BONE MARROW TRANSPLANT  Return  Patient Visit   05/15/2017  Referred by:  No ref. provider found  Referred for: SCD, exchange transfusion     CHIEF COMPLAINT:   Chief Complaint   Patient presents with    Sickle Cell Anemia       HISTORY OF PRESENT ILLNESS:   27 yo female, non verbal due to multiple prior cva, sickle cell disease, referred 2016 for exchange transfusion.   She comes accompanied by her brother and his partner.  The patient's aunt was her previous full time caretaker but recently  and the brother and girlfriend took over care.  They do not know many details of her medical history.  Patient was previously seen at [facility] and apparently was getting monthly exchange transfusions via port but that transportation burden between [place] and [place] had become too much.  She recently established care with Dr. Song in [place] who placed the patient on hydrea.  Caretaker is unaware of any other specific sickle related complications.  The patient goes to the ED infrequently and it has been over a year since her last admission.       She presents today prior to her 3nd exchange transfusion at [facility].    Sister states since resumption of exchange transfusions she is more alert and has more energy. Eating better.  No other changes in clinical status.   Episode of non cardiac chest pain during last procedure required ER visit.  ACS ruled out and discharged home.  Comes with sister and great aunt.   PAST MEDICAL HISTORY:   Past Medical History:   Diagnosis Date    Anemia     Encounter for blood transfusion     Neuromuscular disorder     Seizures     Sickle cell anemia     Stroke        PAST SURGICAL HISTORY:   Past Surgical History:   Procedure Laterality Date    PORTACATH PLACEMENT         PAST SOCIAL HISTORY:   reports that she has never smoked. She does not have any smokeless tobacco history on file. She reports that she does not drink alcohol or  use illicit drugs.    FAMILY HISTORY:  Family History   Problem Relation Age of Onset    Kidney failure Mother     No Known Problems Brother        CURRENT MEDICATIONS:   Current Outpatient Prescriptions   Medication Sig    baclofen (LIORESAL) 10 MG tablet Take 1 tablet by mouth 3 (three) times daily as needed.    clonazePAM (KLONOPIN) 0.5 MG tablet Take 1 tablet by mouth 2 (two) times daily.    folic acid (FOLVITE) 1 MG tablet Take 1 mg by mouth once daily.    hydrocodone-acetaminophen 7.5-325mg (NORCO) 7.5-325 mg per tablet Take 1 tablet by mouth 3 (three) times daily.    levetiracetam (KEPPRA) 1000 MG tablet Take 1 tablet by mouth 2 (two) times daily.    senna-docusate 8.6-50 mg (SENNA WITH DOCUSATE SODIUM) 8.6-50 mg per tablet Take 1 tablet by mouth once daily.     No current facility-administered medications for this visit.      ALLERGIES:   Review of patient's allergies indicates:  No Known Allergies          REVIEW OF SYSTEMS:   Unable to obtain ROS due to mental status      PHYSICAL EXAM:   Vitals:    05/12/17 1440   BP: 98/67   Pulse: 99       General - non verbal, contractured wheelchair bound, thin,; appears comfortable  Head & Face - no sinus tenderness  Eyes - normal conjunctivae and lids   ENT - normal external auditory canals and tympanic membranes bilaterally oropharynx clear,  Normal dentition and gums  Neck - normal thyroid  Chest and Lung - normal respiratory effort, clear to auscultation bilaterally   Cardiovascular - RRR with no MGR, normal S1 and S2; no pedal edema  Abdomen -  soft, nontender, no palpable hepatomegaly or splenomegaly  Lymph - no palpable lymphadenopathy  Extremities - unremarkable nails and digits  Heme - no bruising, petechiae, pallor  Skin - no rashes or lesion      ECOG Performance Status: (foot note - ECOG PS provided by Eastern Cooperative Oncology Group) 3 - Symptomatic, >50% confined to bed    Karnofsky Performance Score:  40%- Disabled: Requires Special Care and  Assistance  DATA:   Lab Results   Component Value Date    WBC 7.18 05/12/2017    HGB 8.1 (L) 05/12/2017    HCT 23.6 (L) 05/12/2017     (H) 05/12/2017     05/12/2017     Gran #   Date Value Ref Range Status   05/12/2017 3.6 1.8 - 7.7 K/uL Final     Gran%   Date Value Ref Range Status   05/12/2017 49.6 38.0 - 73.0 % Final     CMP  Sodium   Date Value Ref Range Status   05/12/2017 135 (L) 136 - 145 mmol/L Final     Potassium   Date Value Ref Range Status   05/12/2017 5.8 (H) 3.5 - 5.1 mmol/L Final     Comment:     *Moderately Hemolyzed     Chloride   Date Value Ref Range Status   05/12/2017 106 95 - 110 mmol/L Final     CO2   Date Value Ref Range Status   05/12/2017 21 (L) 23 - 29 mmol/L Final     Glucose   Date Value Ref Range Status   05/12/2017 77 70 - 110 mg/dL Final     BUN, Bld   Date Value Ref Range Status   05/12/2017 8 6 - 20 mg/dL Final     Creatinine   Date Value Ref Range Status   05/12/2017 0.5 0.5 - 1.4 mg/dL Final     Calcium   Date Value Ref Range Status   05/12/2017 7.9 (L) 8.7 - 10.5 mg/dL Final     Total Protein   Date Value Ref Range Status   05/12/2017 8.5 (H) 6.0 - 8.4 g/dL Final     Albumin   Date Value Ref Range Status   05/12/2017 3.0 (L) 3.5 - 5.2 g/dL Final     Total Bilirubin   Date Value Ref Range Status   05/12/2017 1.5 (H) 0.1 - 1.0 mg/dL Final     Comment:     For infants and newborns, interpretation of results should be based  on gestational age, weight and in agreement with clinical  observations.  Premature Infant recommended reference ranges:  Up to 24 hours.............<8.0 mg/dL  Up to 48 hours............<12.0 mg/dL  3-5 days..................<15.0 mg/dL  6-29 days.................<15.0 mg/dL       Alkaline Phosphatase   Date Value Ref Range Status   05/12/2017 238 (H) 55 - 135 U/L Final     AST   Date Value Ref Range Status   05/12/2017 63 (H) 10 - 40 U/L Final     ALT   Date Value Ref Range Status   05/12/2017 23 10 - 44 U/L Final     Anion Gap   Date Value Ref  Range Status   05/12/2017 8 8 - 16 mmol/L Final     eGFR if    Date Value Ref Range Status   05/12/2017 >60.0 >60 mL/min/1.73 m^2 Final     eGFR if non    Date Value Ref Range Status   05/12/2017 >60.0 >60 mL/min/1.73 m^2 Final     Comment:     Calculation used to obtain the estimated glomerular filtration  rate (eGFR) is the CKD-EPI equation. Since race is unknown   in our information system, the eGFR values for   -American and Non--American patients are given   for each creatinine result.       Lab Results   Component Value Date    FERRITIN 2907 (H) 05/12/2017   Results for LUIS SHAH (MRN [id number]) as of 12/1/2016 06:37   Ref. Range 11/30/2016 09:12   Retic Latest Ref Range: 0.5 - 2.5 % 5.3 (H)     Basket      Reviewed  Result Note  View in In Basket        MyChart Results Release      MyChart Status: Inactive           Hemoglobin Electrophoresis,Hgb A2 Nate.   Status:  Final result   Visible to patient:  No (Not Released) Next appt:  None Dx:  Hb-SS disease with crisis Order: [id number]           Ref Range & Units 1d ago   3wk ago   3mo ago      Hgb A2 Quant 2.2 - 3.2 % 3.4 (H)  2.7    Hemoglobin Bands  Hb A and Hb S Hb F and Hb A2  Hb S , Hb F , Hb A2    Hemoglobin Electrophoresis Interp  See comment See commentCM See commentCM   Comments: Interpreted by Vanessa Hatch M.D.   There are prominent bands in the A and S positions,   measuring approximately 45 % and 42 % respectively.  There is also a   hemoglobin F band of approximately 8%.  This   pattern is consistent with the patient history of sickle cell anemia   and recent RBC transfusion.            Comments: Interpreted by Vanessa Hatch M.D.  - 5/12/17   Hemoglobins A, S and F are present measuring approximately 43%, 45%   and 9% respectively.  This pattern is consistent with the patient   history of sickle cell anemia and RBC transfusion.   TTE - 3/6/17  CONCLUSIONS     1 - Normal left  ventricular systolic function (EF 55-60%).     2 - Normal left ventricular diastolic function.     3 - Normal right ventricular systolic function .  ASSESSMENT AND PLAN:   Encounter Diagnoses   Name Primary?    History of exchange transfusion Yes    Hb-SS disease without crisis      -history of SCD with at least multiple CVA now nonverbal and wheelchair bound  -accompanying caretakers unclear of her specific medical history; she was receiving monthly exchange transfusions at [facility] with interruption due to social issues; resumed exchanged transfusions at [facility] February 2017  -recommend continue hydrea 500mg daily; dose not appear to have frequent pain crisis  -continue folic acid daily  -continue al other current medications  - secondary prophylactic monthly exchange transfusions are indicated to prevent further CVA with goal  To keep hgb S <30%; hgb S significantly improved; stable from last exchange but this was interrupted due to chest pain episode   -discussed iron chelation at length with her family and they do not want to proceed given risks and [facility]       Sickle Cell Disease Monitoring   1)Hydrea  -as above   2)Iron Overload  -as above; she has no over evidence of end organ damage from iron overload  3)AVN  -GORAN  4)LE Ulcerations  -GORAN  5)HTN  -normotensive today  6)Opthalmic  -see by [facility];  Next appt due April 2018  7)Pain  -appears comfortable today  -continue prn norco  8)CardioPulmonary  - baseline TTE today with no major abnormality ; next due march 2019    9)Renal  -obtain urine pr/cr at next appt    10)Neuro/CVA  -continue keppra for prior seizure  -exchange transfusiosn as above    11)Vaccines   -will need to readminister vaccines and document at [facility]; scheduled for next appt   - HIB series, menactra, prevnar followed by pneumovax after 8 weeks     12)Contraception  -NA      Follow Up:  Cbc, cmp, type and screen, hemoglobin EP, vaccines, and MD appt and exchange transfusion in one month    Jacob Hanson MD  Hematology/Oncology/Bone Marrow Transplant      Addendum - same day of appt during exchange transfusion patient noted to have chest pain at initiation of transfusiosn; rapid reponse called; pain resolved but rhythm strip showed tachyarrhythmia with rate in the 130's-140's so transfusion stopped and patient sent to ED.  FU as above

## 2017-05-16 LAB
HGB FRACT BLD ELPH-IMP: NORMAL
HGB S MFR BLD ELPH: 9.9 %

## 2017-06-16 ENCOUNTER — OFFICE VISIT (OUTPATIENT)
Dept: HEMATOLOGY/ONCOLOGY | Facility: CLINIC | Age: 27
End: 2017-06-16
Payer: MEDICARE

## 2017-06-16 ENCOUNTER — HOSPITAL ENCOUNTER (OUTPATIENT)
Dept: TRANSFUSION MEDICINE | Facility: HOSPITAL | Age: 27
Discharge: HOME | End: 2017-06-16
Attending: INTERNAL MEDICINE
Payer: MEDICARE

## 2017-06-16 ENCOUNTER — CLINICAL SUPPORT (OUTPATIENT)
Dept: INFECTIOUS DISEASES | Facility: CLINIC | Age: 27
End: 2017-06-16
Payer: MEDICARE

## 2017-06-16 VITALS
TEMPERATURE: 97 F | HEART RATE: 112 BPM | BODY MASS INDEX: 14.33 KG/M2 | RESPIRATION RATE: 24 BRPM | SYSTOLIC BLOOD PRESSURE: 92 MMHG | DIASTOLIC BLOOD PRESSURE: 72 MMHG | HEIGHT: 65 IN | WEIGHT: 86 LBS

## 2017-06-16 VITALS
BODY MASS INDEX: 19.97 KG/M2 | DIASTOLIC BLOOD PRESSURE: 68 MMHG | TEMPERATURE: 99 F | HEART RATE: 93 BPM | WEIGHT: 120 LBS | SYSTOLIC BLOOD PRESSURE: 99 MMHG

## 2017-06-16 DIAGNOSIS — D57.1 HB-SS DISEASE WITHOUT CRISIS: Primary | ICD-10-CM

## 2017-06-16 PROCEDURE — 99999 PR PBB SHADOW E&M-EST. PATIENT-LVL III: CPT | Mod: PBBFAC,,, | Performed by: INTERNAL MEDICINE

## 2017-06-16 PROCEDURE — 36512 APHERESIS RBC: CPT | Mod: ,,, | Performed by: PATHOLOGY

## 2017-06-16 PROCEDURE — 99214 OFFICE O/P EST MOD 30 MIN: CPT | Mod: S$PBB,,, | Performed by: INTERNAL MEDICINE

## 2017-06-16 RX ORDER — DIPHENHYDRAMINE HYDROCHLORIDE 50 MG/ML
25 INJECTION INTRAMUSCULAR; INTRAVENOUS ONCE
Status: COMPLETED | OUTPATIENT
Start: 2017-06-16 | End: 2017-06-16

## 2017-06-16 RX ORDER — DIPHENHYDRAMINE HYDROCHLORIDE 50 MG/ML
50 INJECTION INTRAMUSCULAR; INTRAVENOUS ONCE
Status: DISCONTINUED | OUTPATIENT
Start: 2017-06-16 | End: 2017-06-16

## 2017-06-16 RX ORDER — HEPARIN SODIUM 1000 [USP'U]/ML
4400 INJECTION, SOLUTION INTRAVENOUS; SUBCUTANEOUS ONCE
Status: COMPLETED | OUTPATIENT
Start: 2017-06-16 | End: 2017-06-16

## 2017-06-16 RX ADMIN — DIPHENHYDRAMINE HYDROCHLORIDE 25 MG: 50 INJECTION INTRAMUSCULAR; INTRAVENOUS at 12:06

## 2017-06-16 RX ADMIN — ALTEPLASE 4 MG: 2.2 INJECTION, POWDER, LYOPHILIZED, FOR SOLUTION INTRAVENOUS at 11:06

## 2017-06-16 RX ADMIN — HEPARIN SODIUM 4400 UNITS: 1000 INJECTION, SOLUTION INTRAVENOUS; SUBCUTANEOUS at 01:06

## 2017-06-16 NOTE — PROGRESS NOTES
Pt presented to outpatient apheresis dept in a wheelchair for a red blood cell exchange, she is accompanied by her 2 brothers and her grandmother.  She is alert, unable to determine orientation and pain due to patients limited verbal abilities, she appears comfortable.  Mid chest vortex port successfully accessed with 2, 16 gauge needles, cathflo instilled into both sides at 1125, withdrew with ease at 1210.  25 mg of benadryl given IVP.  Red cell exchange started at 1212 without difficulty.  Replacement fluids 4 units of pRBC's.  Tx ended at 1330.  Port flushed and locked.  Pt tolerated tx well.  Next tx will be scheduled by Dr Hanson's office.  Pt exited dept in a wheelchair with her family.

## 2017-06-16 NOTE — PROGRESS NOTES
SECTION OF HEMATOLOGY AND BONE MARROW TRANSPLANT  Return  Patient Visit   2017  Referred by:  No ref. provider found  Referred for: SCD, exchange transfusion     CHIEF COMPLAINT:   Chief Complaint   Patient presents with    Sickle Cell Anemia       HISTORY OF PRESENT ILLNESS:   26 yo female, non verbal due to multiple prior cva, sickle cell disease, referred 2016 for exchange transfusion.   She comes accompanied by her brother and his partner.  The patient's aunt was her previous full time caretaker but recently  and the brother and girlfriend took over care.  They do not know many details of her medical history.  Patient was previously seen at [facility] and apparently was getting monthly exchange transfusions via port but that transportation burden between [place] and [place] had become too much.  She recently established care with Dr. Song in [place] who placed the patient on hydrea.  Caretaker is unaware of any other specific sickle related complications.  The patient goes to the ED infrequently and it has been over a year since her last admission.       She presents today prior to her 4th exchange transfusion at [facility].    Sister states since resumption of exchange transfusions she is more alert and has more energy. Eating better.  No other changes in clinical status.   PAST MEDICAL HISTORY:   Past Medical History:   Diagnosis Date    Anemia     Encounter for blood transfusion     Neuromuscular disorder     Seizures     Sickle cell anemia     Stroke        PAST SURGICAL HISTORY:   Past Surgical History:   Procedure Laterality Date    PORTACATH PLACEMENT         PAST SOCIAL HISTORY:   reports that she has never smoked. She does not have any smokeless tobacco history on file. She reports that she does not drink alcohol or use drugs.    FAMILY HISTORY:  Family History   Problem Relation Age of Onset    Kidney failure Mother     No Known Problems Brother         CURRENT MEDICATIONS:   Current Outpatient Prescriptions   Medication Sig    baclofen (LIORESAL) 10 MG tablet Take 1 tablet by mouth 3 (three) times daily as needed.    clonazePAM (KLONOPIN) 0.5 MG tablet Take 1 tablet by mouth 2 (two) times daily.    folic acid (FOLVITE) 1 MG tablet Take 1 mg by mouth once daily.    hydrocodone-acetaminophen 7.5-325mg (NORCO) 7.5-325 mg per tablet Take 1 tablet by mouth 3 (three) times daily.    levetiracetam (KEPPRA) 1000 MG tablet Take 1 tablet by mouth 2 (two) times daily.    senna-docusate 8.6-50 mg (SENNA WITH DOCUSATE SODIUM) 8.6-50 mg per tablet Take 1 tablet by mouth once daily.     No current facility-administered medications for this visit.      ALLERGIES:   Review of patient's allergies indicates:  No Known Allergies          REVIEW OF SYSTEMS:   Unable to obtain ROS due to mental status      PHYSICAL EXAM:   Vitals:    06/16/17 0944   BP: 99/68   Pulse: 93   Temp: 98.6 °F (37 °C)       General - non verbal, contractured wheelchair bound, thin,; appears comfortable  Head & Face - no sinus tenderness  Eyes - normal conjunctivae and lids   ENT - normal external auditory canals and tympanic membranes bilaterally oropharynx clear,  Normal dentition and gums  Neck - normal thyroid  Chest and Lung - normal respiratory effort, clear to auscultation bilaterally   Cardiovascular - RRR with no MGR, normal S1 and S2; no pedal edema  Abdomen -  soft, nontender, no palpable hepatomegaly or splenomegaly  Lymph - no palpable lymphadenopathy  Extremities - unremarkable nails and digits  Heme - no bruising, petechiae, pallor  Skin - no rashes or lesion      ECOG Performance Status: (foot note - ECOG PS provided by Eastern Cooperative Oncology Group) 3 - Symptomatic, >50% confined to bed    Karnofsky Performance Score:  40%- Disabled: Requires Special Care and Assistance  DATA:   Lab Results   Component Value Date    WBC 9.48 06/16/2017    HGB 8.6 (L) 06/16/2017    HCT 26.2 (L)  06/16/2017    MCV 97 06/16/2017     (H) 06/16/2017     Gran #   Date Value Ref Range Status   05/12/2017 3.6 1.8 - 7.7 K/uL Final     Gran%   Date Value Ref Range Status   06/16/2017 71.0 38.0 - 73.0 % Final     CMP  Sodium   Date Value Ref Range Status   06/16/2017 147 (H) 136 - 145 mmol/L Final     Potassium   Date Value Ref Range Status   06/16/2017 4.1 3.5 - 5.1 mmol/L Final     Chloride   Date Value Ref Range Status   06/16/2017 113 (H) 95 - 110 mmol/L Final     CO2   Date Value Ref Range Status   06/16/2017 27 23 - 29 mmol/L Final     Glucose   Date Value Ref Range Status   06/16/2017 90 70 - 110 mg/dL Final     BUN, Bld   Date Value Ref Range Status   06/16/2017 11 6 - 20 mg/dL Final     Creatinine   Date Value Ref Range Status   06/16/2017 0.6 0.5 - 1.4 mg/dL Final     Calcium   Date Value Ref Range Status   06/16/2017 8.1 (L) 8.7 - 10.5 mg/dL Final     Total Protein   Date Value Ref Range Status   06/16/2017 7.9 6.0 - 8.4 g/dL Final     Albumin   Date Value Ref Range Status   06/16/2017 2.8 (L) 3.5 - 5.2 g/dL Final     Total Bilirubin   Date Value Ref Range Status   06/16/2017 1.6 (H) 0.1 - 1.0 mg/dL Final     Comment:     For infants and newborns, interpretation of results should be based  on gestational age, weight and in agreement with clinical  observations.  Premature Infant recommended reference ranges:  Up to 24 hours.............<8.0 mg/dL  Up to 48 hours............<12.0 mg/dL  3-5 days..................<15.0 mg/dL  6-29 days.................<15.0 mg/dL       Alkaline Phosphatase   Date Value Ref Range Status   06/16/2017 240 (H) 55 - 135 U/L Final     AST   Date Value Ref Range Status   06/16/2017 64 (H) 10 - 40 U/L Final     ALT   Date Value Ref Range Status   06/16/2017 37 10 - 44 U/L Final     Anion Gap   Date Value Ref Range Status   06/16/2017 7 (L) 8 - 16 mmol/L Final     eGFR if    Date Value Ref Range Status   06/16/2017 >60.0 >60 mL/min/1.73 m^2 Final     eGFR if  non    Date Value Ref Range Status   06/16/2017 >60.0 >60 mL/min/1.73 m^2 Final     Comment:     Calculation used to obtain the estimated glomerular filtration  rate (eGFR) is the CKD-EPI equation. Since race is unknown   in our information system, the eGFR values for   -American and Non--American patients are given   for each creatinine result.       Lab Results   Component Value Date    FERRITIN 2,907 (H) 05/12/2017   Results for LUIS SHAH (MRN [id number]) as of 12/1/2016 06:37   Ref. Range 11/30/2016 09:12   Retic Latest Ref Range: 0.5 - 2.5 % 5.3 (H)     Basket      Reviewed  Result Note  View in In Basket        MyChart Results Release      MyChart Status: Inactive           Hemoglobin Electrophoresis,Hgb A2 Nate.   Status:  Final result   Visible to patient:  No (Not Released) Next appt:  None Dx:  Hb-SS disease with crisis Order: [id number]           Ref Range & Units 1d ago   3wk ago   3mo ago      Hgb A2 Quant 2.2 - 3.2 % 3.4 (H)  2.7    Hemoglobin Bands  Hb A and Hb S Hb F and Hb A2  Hb S , Hb F , Hb A2    Hemoglobin Electrophoresis Interp  See comment See commentCM See commentCM   Comments: Interpreted by Vanessa Hatch M.D.   There are prominent bands in the A and S positions,   measuring approximately 45 % and 42 % respectively.  There is also a   hemoglobin F band of approximately 8%.  This   pattern is consistent with the patient history of sickle cell anemia   and recent RBC transfusion.            Comments: Interpreted by Vanessa Hatch M.D.  - 5/12/17   Hemoglobins A, S and F are present measuring approximately 43%, 45%   and 9% respectively.  This pattern is consistent with the patient   history of sickle cell anemia and RBC transfusion.   TTE - 3/6/17  CONCLUSIONS     1 - Normal left ventricular systolic function (EF 55-60%).     2 - Normal left ventricular diastolic function.     3 - Normal right ventricular systolic function .  ASSESSMENT AND PLAN:    Encounter Diagnosis   Name Primary?   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     -history of SCD with at least multiple CVA now nonverbal and wheelchair bound  -accompanying caretakers unclear of her specific medical history; she was receiving monthly exchange transfusions at [facility] with interruption due to social issues; resumed exchanged transfusions at [facility] February 2017  -recommend continue hydrea 500mg daily; dose not appear to have frequent pain crisis  -continue folic acid daily  -continue al other current medications  - secondary prophylactic monthly exchange transfusions are indicated to prevent further CVA with goal  To keep hgb S <30%; hgb S significantly improved; stable from last exchange but this was interrupted due to chest pain episode   -discussed iron chelation at length with her family and they do not want to proceed given risks and [facility]       Sickle Cell Disease Monitoring   1)Hydrea  -as above   2)Iron Overload  -as above; she has no over evidence of end organ damage from iron overload  3)AVN  -GORAN  4)LE Ulcerations  -GORAN  5)HTN  -normotensive today  6)Opthalmic  -see by [facility];  Next appt due April 2018  7)Pain  -appears comfortable today  -continue prn norco  8)CardioPulmonary  - baseline TTE today with no major abnormality ; next due march 2019    9)Renal  -attempted to obtain urine pr/cr at next appt but would require straight cath which family refusing     10)Neuro/CVA  -continue keppra for prior seizure  -exchange transfusiosn as above    11)Vaccines   -will need to readminister vaccines and document at [facility]; scheduled for next appt   - HIB series, menactra, prevnar followed by pneumovax after 8 weeks   -address at next appt     12)Contraception  -NA      Follow Up:  Cbc, cmp, type and screen, hemoglobin EP, , and MD appt and exchange transfusion in one month   Jacob Hanson MD  Hematology/Oncology/Bone Marrow Transplant

## 2017-06-16 NOTE — PROCEDURES
[facility]  Pathology  Procedure Note    SUMMARY   Therapeutic Apheresis; Red Blood Cells  Date/Time: 6/16/2017 3:03 PM  Performed by: DORY TAMAYO  Authorized by: JODI BOWEN   Consent: Verbal consent obtained. Written consent obtained.      Date of Procedure: 6/16/2017     Procedure: Red Blood Cell Exchange    Provider: Dory Tamayo MD  Resident: Keaton Gold MD     Assisting Provider: None    Pre-Procedure Diagnosis: Sickle cell disease  Post-Procedure Diagnosis: Sickle cell disease    Follow-up Assessment: 27 year-old female with sickle cell disease and history of multiple CVAs presents for monthly RBC exchange transfusion. Patient is minimally verbal and is wheelchair bound. Care givers are present throughout procedure. Patient will be exchanged 4 units C, E, K antigen matched, LR, sickle negative, B+ blood. Goal exchange target is to increase post-exchange Hct to 30%.     Sickle cell disease, non-acute, carries a Category I Grade 1B indication for RBC exchange for stroke prophylaxis/iron overload prevention via the 2016 Journal of Clinical Apheresis Guidelines (Berger J et al. Journal of Clinical Apheresis 2016; 31:149-162.)     Pertinent Laboratory Data:   Complete Blood Count:   Lab Results   Component Value Date    HGB 8.6 (L) 06/16/2017    HCT 26.2 (L) 06/16/2017     (H) 06/16/2017    WBC 7.18 05/12/2017     Comprehensive Metabolic Panel:   Lab Results   Component Value Date     (H) 06/16/2017    K 4.1 06/16/2017     (H) 06/16/2017    CO2 27 06/16/2017    GLU 90 06/16/2017    BUN 11 06/16/2017    CREATININE 0.6 06/16/2017    CALCIUM 8.1 (L) 06/16/2017    PROT 7.9 06/16/2017    ALBUMIN 2.8 (L) 06/16/2017    BILITOT 1.6 (H) 06/16/2017    ALKPHOS 240 (H) 06/16/2017    AST 64 (H) 06/16/2017    ALT 37 06/16/2017    ANIONGAP 7 (L) 06/16/2017    EGFRNONAA >60.0 06/16/2017       Pertinent Medications:   Folic acid 1 mg daily  Hydrea 500mg  daily    Review of patient's allergies indicates:  No Known Allergies    Anesthesia: None     Technical Procedures Used: Red Blood Cell Exchange: Approximately 4 units of packed red blood cells were exchanged.    Description of the Findings of the Procedure:   Please see Apheresis Nurse flowsheet for details.    The patient was evaluated and all clinical and laboratory data relevant to the treatment was reviewed, and a decision was made to proceed with the Apheresis procedure.    I was available to the clinical staff throughout the procedure. The next scheduled procedure will be at a later date, per primary Hematologist.    Significant Surgical Tasks Conducted by the Assistant(s): Not applicable    Complications: None  Estimated Blood Loss (EBL): None  Implants: None   Specimens: None    ATTENDING MD ATTESTATION:  The apheresis procedure was performed under my supervision. I was available throughout the procedure. The note has been edited, where and when necessary, to reflect my agreement.  I reviewed all clinical and laboratory data and reviewed the note written by Dr. Gold and agree with the findings, assessment and plan.   The patient tolerated the procedure without complications and was discharged to home in good condition with her family. The next scheduled procedure is to be determined by Dr. Hanson's office.     Dory Meyer MD, PhD  Section of Transfusion Medicine & Histocompatibility  Department of Pathology and Laboratory Medicine  [facility]  [phone] (HLA & Blood Bank Offices)  06/16/2017

## 2017-06-19 LAB
HGB FRACT BLD ELPH-IMP: NORMAL
HGB S MFR BLD ELPH: 7.4 %

## 2017-08-04 ENCOUNTER — OFFICE VISIT (OUTPATIENT)
Dept: HEMATOLOGY/ONCOLOGY | Facility: CLINIC | Age: 27
End: 2017-08-04
Payer: MEDICARE

## 2017-08-04 ENCOUNTER — HOSPITAL ENCOUNTER (OUTPATIENT)
Dept: TRANSFUSION MEDICINE | Facility: HOSPITAL | Age: 27
Discharge: HOME | End: 2017-08-04
Attending: INTERNAL MEDICINE
Payer: MEDICARE

## 2017-08-04 VITALS
BODY MASS INDEX: 14.33 KG/M2 | DIASTOLIC BLOOD PRESSURE: 74 MMHG | HEART RATE: 106 BPM | TEMPERATURE: 98 F | SYSTOLIC BLOOD PRESSURE: 105 MMHG | WEIGHT: 86 LBS | HEIGHT: 65 IN

## 2017-08-04 VITALS
WEIGHT: 86 LBS | SYSTOLIC BLOOD PRESSURE: 107 MMHG | BODY MASS INDEX: 14.31 KG/M2 | TEMPERATURE: 98 F | DIASTOLIC BLOOD PRESSURE: 68 MMHG | HEART RATE: 96 BPM

## 2017-08-04 DIAGNOSIS — Z86.73 HISTORY OF STROKE: ICD-10-CM

## 2017-08-04 DIAGNOSIS — D57.1 HB-SS DISEASE WITHOUT CRISIS: Primary | ICD-10-CM

## 2017-08-04 LAB
BLD PROD TYP BPU: NORMAL
BLOOD UNIT EXPIRATION DATE: NORMAL
BLOOD UNIT TYPE CODE: 1700
BLOOD UNIT TYPE CODE: 9500
BLOOD UNIT TYPE: NORMAL
CODING SYSTEM: NORMAL
DISPENSE STATUS: NORMAL
NUM UNITS TRANS PACKED RBC: NORMAL

## 2017-08-04 PROCEDURE — 99214 OFFICE O/P EST MOD 30 MIN: CPT | Mod: S$PBB,,, | Performed by: INTERNAL MEDICINE

## 2017-08-04 PROCEDURE — 36415 COLL VENOUS BLD VENIPUNCTURE: CPT

## 2017-08-04 PROCEDURE — 63600175 PHARM REV CODE 636 W HCPCS: Performed by: PATHOLOGY

## 2017-08-04 PROCEDURE — 83020 HEMOGLOBIN ELECTROPHORESIS: CPT

## 2017-08-04 PROCEDURE — 99999 PR PBB SHADOW E&M-EST. PATIENT-LVL III: CPT | Mod: PBBFAC,,, | Performed by: INTERNAL MEDICINE

## 2017-08-04 PROCEDURE — P9016 RBC LEUKOCYTES REDUCED: HCPCS

## 2017-08-04 PROCEDURE — 36512 APHERESIS RBC: CPT

## 2017-08-04 PROCEDURE — 86920 COMPATIBILITY TEST SPIN: CPT

## 2017-08-04 PROCEDURE — 86902 BLOOD TYPE ANTIGEN DONOR EA: CPT

## 2017-08-04 RX ORDER — DIPHENHYDRAMINE HYDROCHLORIDE 50 MG/ML
25 INJECTION INTRAMUSCULAR; INTRAVENOUS ONCE
Status: COMPLETED | OUTPATIENT
Start: 2017-08-04 | End: 2017-08-04

## 2017-08-04 RX ORDER — HEPARIN SODIUM 1000 [USP'U]/ML
4400 INJECTION, SOLUTION INTRAVENOUS; SUBCUTANEOUS ONCE
Status: COMPLETED | OUTPATIENT
Start: 2017-08-04 | End: 2017-08-04

## 2017-08-04 RX ADMIN — DIPHENHYDRAMINE HYDROCHLORIDE 25 MG: 50 INJECTION INTRAMUSCULAR; INTRAVENOUS at 12:08

## 2017-08-04 RX ADMIN — ALTEPLASE 2 MG: 2.2 INJECTION, POWDER, LYOPHILIZED, FOR SOLUTION INTRAVENOUS at 10:08

## 2017-08-04 RX ADMIN — HEPARIN SODIUM 4400 UNITS: 1000 INJECTION, SOLUTION INTRAVENOUS; SUBCUTANEOUS at 01:08

## 2017-08-04 NOTE — PROGRESS NOTES
Pt to Apheresis per wheelchair by family. Vortex port accessed per Apheresis RN without difficulty.  Cath anabel instilled 1 hour prior to start. .Apheresis tx started at 1203 without difficulty.  Pt stated was fine.  Pt oriented x4.  4 unit RBC exchange completed.  Replaced with 4 units PRBC 's.   Pt stated no pain.  0 on pain scale.  Meds 25mg Benadryl IVP per Apheresis RN prior to start.  Tx ended at 1331. Vortex port  flushed and locked.  Pt tolerated tx well.  Next tx not scheduled. Pt exited Apheresis by wheelchair per family members.

## 2017-08-04 NOTE — Clinical Note
Cbc, cmp, type and screen, retic, ferritin,  hemoglobin EP, , and MD appt and exchange transfusion on 9/6

## 2017-08-04 NOTE — PROGRESS NOTES
SECTION OF HEMATOLOGY AND BONE MARROW TRANSPLANT  Return  Patient Visit   2017  Referred by:  No ref. provider found  Referred for: SCD, exchange transfusion     CHIEF COMPLAINT:   No chief complaint on file.      HISTORY OF PRESENT ILLNESS:   26 yo female, non verbal due to multiple prior cva, sickle cell disease, referred 2016 for exchange transfusion.   She comes accompanied by her brother and his partner.  The patient's aunt was her previous full time caretaker but recently  and the brother and girlfriend took over care.  They do not know many details of her medical history.  Patient was previously seen at [facility] and apparently was getting monthly exchange transfusions via port but that transportation burden between [place] and [place] had become too much.  She recently established care with Dr. Song in [place] who placed the patient on hydrea.  Caretaker is unaware of any other specific sickle related complications.  The patient goes to the ED infrequently and it has been over a year since her last admission.       She presents today prior to her 5th [place] [facility]    Sister states since resumption of exchange transfusions she is more alert and has more energy. Eating better.  No other changes in clinical status.   PAST MEDICAL HISTORY:   Past Medical History:   Diagnosis Date    Anemia     Encounter for blood transfusion     Neuromuscular disorder     Seizures     Sickle cell anemia     Stroke        PAST SURGICAL HISTORY:   Past Surgical History:   Procedure Laterality Date    PORTACATH PLACEMENT         PAST SOCIAL HISTORY:   reports that she has never smoked. She does not have any smokeless tobacco history on file. She reports that she does not drink alcohol or use drugs.    FAMILY HISTORY:  Family History   Problem Relation Age of Onset    Kidney failure Mother     No Known Problems Brother        CURRENT MEDICATIONS:   Current  Outpatient Prescriptions   Medication Sig    baclofen (LIORESAL) 10 MG tablet Take 1 tablet by mouth 3 (three) times daily as needed.    clonazePAM (KLONOPIN) 0.5 MG tablet Take 1 tablet by mouth 2 (two) times daily.    folic acid (FOLVITE) 1 MG tablet Take 1 mg by mouth once daily.    hydrocodone-acetaminophen 7.5-325mg (NORCO) 7.5-325 mg per tablet Take 1 tablet by mouth 3 (three) times daily.    levetiracetam (KEPPRA) 1000 MG tablet Take 1 tablet by mouth 2 (two) times daily.    senna-docusate 8.6-50 mg (SENNA WITH DOCUSATE SODIUM) 8.6-50 mg per tablet Take 1 tablet by mouth once daily.     No current facility-administered medications for this visit.      ALLERGIES:   Review of patient's allergies indicates:  No Known Allergies          REVIEW OF SYSTEMS:   Unable to obtain ROS due to mental status      PHYSICAL EXAM:   Vitals:    08/04/17 0925   BP: 107/68   Pulse: 96   Temp: 98.3 °F (36.8 °C)       General - non verbal, contractured wheelchair bound, thin,; appears comfortable  Head & Face - no sinus tenderness  Eyes - normal conjunctivae and lids   ENT - normal external auditory canals and tympanic membranes bilaterally oropharynx clear,  Normal dentition and gums  Neck - normal thyroid  Chest and Lung - normal respiratory effort, clear to auscultation bilaterally   Cardiovascular - RRR with no MGR, normal S1 and S2; no pedal edema  Abdomen -  soft, nontender, no palpable hepatomegaly or splenomegaly  Lymph - no palpable lymphadenopathy  Extremities - unremarkable nails and digits  Heme - no bruising, petechiae, pallor  Skin - no rashes or lesion      ECOG Performance Status: (foot note - ECOG PS provided by Eastern Cooperative Oncology Group) 3 - Symptomatic, >50% confined to bed    Karnofsky Performance Score:  40%- Disabled: Requires Special Care and Assistance  DATA:   Lab Results   Component Value Date    WBC 10.31 08/04/2017    HGB 9.7 (L) 08/04/2017    HCT 28.8 (L) 08/04/2017    MCV 94 08/04/2017      08/04/2017     Gran #   Date Value Ref Range Status   08/04/2017 5.2 1.8 - 7.7 K/uL Final     Gran%   Date Value Ref Range Status   08/04/2017 51.0 38.0 - 73.0 % Final     CMP  Sodium   Date Value Ref Range Status   08/04/2017 143 136 - 145 mmol/L Final     Potassium   Date Value Ref Range Status   08/04/2017 4.7 3.5 - 5.1 mmol/L Final     Comment:     *Slightly Hemolyzed     Chloride   Date Value Ref Range Status   08/04/2017 112 (H) 95 - 110 mmol/L Final     CO2   Date Value Ref Range Status   08/04/2017 23 23 - 29 mmol/L Final     Glucose   Date Value Ref Range Status   08/04/2017 86 70 - 110 mg/dL Final     BUN, Bld   Date Value Ref Range Status   08/04/2017 8 6 - 20 mg/dL Final     Creatinine   Date Value Ref Range Status   08/04/2017 0.6 0.5 - 1.4 mg/dL Final     Calcium   Date Value Ref Range Status   08/04/2017 8.1 (L) 8.7 - 10.5 mg/dL Final     Total Protein   Date Value Ref Range Status   08/04/2017 8.7 (H) 6.0 - 8.4 g/dL Final     Albumin   Date Value Ref Range Status   08/04/2017 2.9 (L) 3.5 - 5.2 g/dL Final     Total Bilirubin   Date Value Ref Range Status   08/04/2017 2.9 (H) 0.1 - 1.0 mg/dL Final     Comment:     For infants and newborns, interpretation of results should be based  on gestational age, weight and in agreement with clinical  observations.  Premature Infant recommended reference ranges:  Up to 24 hours.............<8.0 mg/dL  Up to 48 hours............<12.0 mg/dL  3-5 days..................<15.0 mg/dL  6-29 days.................<15.0 mg/dL       Alkaline Phosphatase   Date Value Ref Range Status   08/04/2017 276 (H) 55 - 135 U/L Final     AST   Date Value Ref Range Status   08/04/2017 91 (H) 10 - 40 U/L Final     ALT   Date Value Ref Range Status   08/04/2017 46 (H) 10 - 44 U/L Final     Anion Gap   Date Value Ref Range Status   08/04/2017 8 8 - 16 mmol/L Final     eGFR if    Date Value Ref Range Status   08/04/2017 >60.0 >60 mL/min/1.73 m^2 Final     eGFR if  non    Date Value Ref Range Status   08/04/2017 >60.0 >60 mL/min/1.73 m^2 Final     Comment:     Calculation used to obtain the estimated glomerular filtration  rate (eGFR) is the CKD-EPI equation. Since race is unknown   in our information system, the eGFR values for   -American and Non--American patients are given   for each creatinine result.       Lab Results   Component Value Date    FERRITIN 2,907 (H) 05/12/2017   Results for LUIS SHAH (MRN [id number]) as of 12/1/2016 06:37   Ref. Range 11/30/2016 09:12   Retic Latest Ref Range: 0.5 - 2.5 % 5.3 (H)     Basket      Reviewed  Result Note  View in In Basket        MyChart Results Release      MyChart Status: Inactive           Hemoglobin Electrophoresis,Hgb A2 Nate.   Status:  Final result   Visible to patient:  No (Not Released) Next appt:  None Dx:  Hb-SS disease with crisis Order: [id number]           Ref Range & Units 1d ago   3wk ago   3mo ago      Hgb A2 Quant 2.2 - 3.2 % 3.4 (H)  2.7    Hemoglobin Bands  Hb A and Hb S Hb F and Hb A2  Hb S , Hb F , Hb A2    Hemoglobin Electrophoresis Interp  See comment See commentCM See commentCM   Comments: Interpreted by Vanessa Hatch M.D.   There are prominent bands in the A and S positions,   measuring approximately 45 % and 42 % respectively.  There is also a   hemoglobin F band of approximately 8%.  This   pattern is consistent with the patient history of sickle cell anemia   and recent RBC transfusion.            Comments: Interpreted by Vanessa Hatch M.D.  - 5/12/17   Hemoglobins A, S and F are present measuring approximately 43%, 45%   and 9% respectively.  This pattern is consistent with the patient   history of sickle cell anemia and RBC transfusion.      Hgb A2 Quant 2.2 - 3.2 % 3.3   3.0     Hemoglobin Bands   Hb A , ...   Hb A an...    Hb A , Hb S , Hb F , Hb A2    Hemoglobin Electrophoresis Interp   See com...  See co...CM  See co...CM  See co...CM   See comment    Comments: Hemoglobins A and S are present, measuring approximately 46% and 50%   respectively.  There is also a faint hemoglobin F band of   approximately 1%.  Consistent with history.   Interpreted by Fina Boyer M.D.        TTE - 3/6/17  CONCLUSIONS     1 - Normal left ventricular systolic function (EF 55-60%).     2 - Normal left ventricular diastolic function.     3 - Normal right ventricular systolic function .  ASSESSMENT AND PLAN:   Encounter Diagnosis   Name Primary?   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     -history of SCD with at least multiple CVA now nonverbal and wheelchair bound  -accompanying caretakers unclear of her specific medical history; she was receiving monthly exchange transfusions at [facility] with interruption due to social issues; resumed exchanged transf[place] [facility] [place]  -recommend continue hydrea 500mg daily; dose not appear to have frequent pain crisis  -continue folic acid daily  -continue al other current medications  - secondary prophylactic monthly exchange transfusions are indicated to prevent further CVA with goal  To keep hgb S <30%  -discussed iron chelation at length with her family and they do not want to proceed given risks and GOC       Sickle Cell Disease Monitoring   1)Hydrea  -as above   2)Iron Overload  -as above; she has no over evidence of end organ damage from iron overload  3)AVN  -GORAN  4)LE Ulcerations  -GORAN  5)HTN  -normotensive today  6)Opthalmic  -see by [facility]mology;  Next appt due April 2018  7)Pain  -appears comfortable today  -continue prn norco  8)CardioPulmonary  - baseline TTE today with no major abnormality ; next due march 2019    9)Renal  -attempted to obtain urine pr/cr at next appt but would require straight cath which family refusing     10)Neuro/CVA  -continue keppra for prior seizure  -exchange transfusiosn as above    11)Vaccines   -will need to readminister vaccines and document at [facility]; scheduled for next appt   - HIB  series, menactra, prevnar followed by pneumovax after 8 weeks   -address at next appt     12)Contraception  -NA      Follow Up:  Cbc, cmp, type and screen, retic, ferritin,  hemoglobin EP, , and MD appt and exchange transfusion in one month   Jacob Hanson MD  Hematology/Oncology/Bone Marrow Transplant

## 2017-08-07 LAB
HGB FRACT BLD ELPH-IMP: NORMAL
HGB S MFR BLD ELPH: 10 %

## 2017-08-22 NOTE — DISCHARGE INSTRUCTIONS

## 2017-08-30 ENCOUNTER — TELEPHONE (OUTPATIENT)
Dept: HEMATOLOGY/ONCOLOGY | Facility: CLINIC | Age: 27
End: 2017-08-30

## 2017-09-20 ENCOUNTER — HOSPITAL ENCOUNTER (OUTPATIENT)
Dept: TRANSFUSION MEDICINE | Facility: HOSPITAL | Age: 27
Discharge: HOME | End: 2017-09-20
Attending: INTERNAL MEDICINE

## 2017-09-20 DIAGNOSIS — D57.1 HB-SS DISEASE WITHOUT CRISIS: Primary | ICD-10-CM

## 2017-09-20 DIAGNOSIS — Z86.73 HISTORY OF STROKE: ICD-10-CM

## 2017-09-20 RX ORDER — HEPARIN SODIUM 1000 [USP'U]/ML
4400 INJECTION, SOLUTION INTRAVENOUS; SUBCUTANEOUS ONCE
Status: CANCELLED | OUTPATIENT
Start: 2017-09-20

## 2017-09-29 ENCOUNTER — HOSPITAL ENCOUNTER (OUTPATIENT)
Dept: TRANSFUSION MEDICINE | Facility: HOSPITAL | Age: 27
Discharge: HOME | End: 2017-09-29
Attending: INTERNAL MEDICINE
Payer: MEDICARE

## 2017-09-29 VITALS
BODY MASS INDEX: 14.33 KG/M2 | HEART RATE: 102 BPM | SYSTOLIC BLOOD PRESSURE: 100 MMHG | DIASTOLIC BLOOD PRESSURE: 69 MMHG | WEIGHT: 86 LBS | TEMPERATURE: 99 F | HEIGHT: 65 IN

## 2017-09-29 DIAGNOSIS — Z86.73 HISTORY OF STROKE: ICD-10-CM

## 2017-09-29 DIAGNOSIS — D57.1 HB-SS DISEASE WITHOUT CRISIS: Primary | ICD-10-CM

## 2017-09-29 LAB
ABO + RH BLD: NORMAL
ANISOCYTOSIS BLD QL SMEAR: SLIGHT
BASOPHILS # BLD AUTO: 0.11 K/UL
BASOPHILS NFR BLD: 1 %
BLD GP AB SCN CELLS X3 SERPL QL: NORMAL
BLD PROD TYP BPU: NORMAL
BLOOD UNIT EXPIRATION DATE: NORMAL
BLOOD UNIT TYPE CODE: 5100
BLOOD UNIT TYPE: NORMAL
CODING SYSTEM: NORMAL
DIFFERENTIAL METHOD: ABNORMAL
DISPENSE STATUS: NORMAL
EOSINOPHIL # BLD AUTO: 0.7 K/UL
EOSINOPHIL NFR BLD: 6.3 %
ERYTHROCYTE [DISTWIDTH] IN BLOOD BY AUTOMATED COUNT: 24.1 %
HCT VFR BLD AUTO: 22.6 %
HGB BLD-MCNC: 7.9 G/DL
HYPOCHROMIA BLD QL SMEAR: ABNORMAL
LYMPHOCYTES # BLD AUTO: 3.6 K/UL
LYMPHOCYTES NFR BLD: 32.3 %
MCH RBC QN AUTO: 31.6 PG
MCHC RBC AUTO-ENTMCNC: 35 G/DL
MCV RBC AUTO: 90 FL
MONOCYTES # BLD AUTO: 1.8 K/UL
MONOCYTES NFR BLD: 16.1 %
NEUTROPHILS # BLD AUTO: 4.8 K/UL
NEUTROPHILS NFR BLD: 44.3 %
NUM UNITS TRANS PACKED RBC: NORMAL
OVALOCYTES BLD QL SMEAR: ABNORMAL
PAPPENHEIMER BOD BLD QL SMEAR: PRESENT
PLATELET # BLD AUTO: 214 K/UL
PMV BLD AUTO: ABNORMAL FL
POIKILOCYTOSIS BLD QL SMEAR: ABNORMAL
POLYCHROMASIA BLD QL SMEAR: ABNORMAL
RBC # BLD AUTO: 2.5 M/UL
SICKLE CELLS BLD QL SMEAR: ABNORMAL
TARGETS BLD QL SMEAR: ABNORMAL
WBC # BLD AUTO: 11.08 K/UL

## 2017-09-29 PROCEDURE — 36512 APHERESIS RBC: CPT

## 2017-09-29 PROCEDURE — 63600175 PHARM REV CODE 636 W HCPCS: Performed by: PATHOLOGY

## 2017-09-29 PROCEDURE — 36512 APHERESIS RBC: CPT | Mod: ,,, | Performed by: PATHOLOGY

## 2017-09-29 PROCEDURE — 86920 COMPATIBILITY TEST SPIN: CPT

## 2017-09-29 PROCEDURE — 83020 HEMOGLOBIN ELECTROPHORESIS: CPT

## 2017-09-29 PROCEDURE — P9016 RBC LEUKOCYTES REDUCED: HCPCS

## 2017-09-29 PROCEDURE — 86901 BLOOD TYPING SEROLOGIC RH(D): CPT

## 2017-09-29 PROCEDURE — 36415 COLL VENOUS BLD VENIPUNCTURE: CPT

## 2017-09-29 PROCEDURE — 86900 BLOOD TYPING SEROLOGIC ABO: CPT

## 2017-09-29 PROCEDURE — 85025 COMPLETE CBC W/AUTO DIFF WBC: CPT

## 2017-09-29 RX ORDER — HEPARIN SODIUM 1000 [USP'U]/ML
4400 INJECTION, SOLUTION INTRAVENOUS; SUBCUTANEOUS ONCE
Status: COMPLETED | OUTPATIENT
Start: 2017-09-29 | End: 2017-09-29

## 2017-09-29 RX ORDER — DIPHENHYDRAMINE HYDROCHLORIDE 50 MG/ML
25 INJECTION INTRAMUSCULAR; INTRAVENOUS ONCE
Status: COMPLETED | OUTPATIENT
Start: 2017-09-29 | End: 2017-09-29

## 2017-09-29 RX ADMIN — HEPARIN SODIUM 4400 UNITS: 1000 INJECTION, SOLUTION INTRAVENOUS; SUBCUTANEOUS at 01:09

## 2017-09-29 RX ADMIN — ALTEPLASE 2 MG: 2.2 INJECTION, POWDER, LYOPHILIZED, FOR SOLUTION INTRAVENOUS at 10:09

## 2017-09-29 RX ADMIN — DIPHENHYDRAMINE HYDROCHLORIDE 25 MG: 50 INJECTION INTRAMUSCULAR; INTRAVENOUS at 11:09

## 2017-09-29 NOTE — PROGRESS NOTES
Pt presented to outpatient apheresis dept in a wheelchair for a red blood cell exchange, she is accompanied by her brother and another family member.  She is alert, unable to determine orientation and pain due to patients limited verbal abilities, she appears comfortable.  Mid chest vortex port successfully accessed with 2, 16 gauge needles, cathflo instilled into both sides at 1025, withdrew with ease at 1130.  25 mg of benadryl given IVP.  Red cell exchange started at 1141 without difficulty.  Replacement fluids 4 units of pRBC's.  Tx ended at 1257. Hgb S drawn from port.  Port flushed and locked.  Pt tolerated tx well.  Next tx will be scheduled by Dr Hanson's office.  Pt exited dept in a wheelchair with her family.

## 2017-09-30 NOTE — PROCEDURES
[facility]  Pathology  Procedure Note    SUMMARY   Therapeutic Apheresis; Red Blood Cells  Date/Time: 9/29/2017 8:58 PM  Performed by: JODI MARTINEZ.  Authorized by: SANTY TAMAYO     Date of Procedure: 9/29/2017     Procedure: Red Blood Cell Exchange    Provider: Jodi Martinez MD     Assisting Provider: None    Pre-Procedure Diagnosis: Sickle cell disease  Post-Procedure Diagnosis: Sickle cell disease    Follow-up Assessment: 27 year-old female with sickle cell disease and history of multiple CVAs presents for monthly RBC exchange transfusion. Patient is minimally verbal and is wheelchair bound. Care givers are present throughout procedure. Patient will be exchanged 4 units C, E, K antigen matched, LR, sickle negative, B+ blood. Goal exchange target is to increase post-exchange Hct to 30%.     Sickle cell disease, non-acute, carries a Category I Grade 1B indication for RBC exchange for stroke prophylaxis/iron overload prevention via the 2016 Journal of Clinical Apheresis Guidelines (Berger J et al. Journal of Clinical Apheresis 2016; 31:149-162.)     Overall, today's exchange was well tolerated and without complications.  Post-exchange HbS level drawn.  Next treatment will be scheduled by Dr. Hanson's office.    Pertinent Laboratory Data:   Complete Blood Count:   Lab Results   Component Value Date    HGB 7.9 (L) 09/29/2017    HCT 22.6 (L) 09/29/2017     09/29/2017    WBC 11.08 09/29/2017     Comprehensive Metabolic Panel:   Lab Results   Component Value Date     08/04/2017    K 4.7 08/04/2017     (H) 08/04/2017    CO2 23 08/04/2017    GLU 86 08/04/2017    BUN 8 08/04/2017    CREATININE 0.6 08/04/2017    CALCIUM 8.1 (L) 08/04/2017    PROT 8.7 (H) 08/04/2017    ALBUMIN 2.9 (L) 08/04/2017    BILITOT 2.9 (H) 08/04/2017    ALKPHOS 276 (H) 08/04/2017    AST 91 (H) 08/04/2017    ALT 46 (H) 08/04/2017    ANIONGAP 8 08/04/2017    EGFRNONAA >60.0 08/04/2017        Pertinent Medications:   Folic acid 1 mg daily  Hydrea 500mg daily    Review of patient's allergies indicates:  No Known Allergies    Anesthesia: None     Technical Procedures Used: Red Blood Cell Exchange: Approximately 4 units of packed red blood cells were exchanged.    Description of the Findings of the Procedure:   Please see Apheresis Nurse flowsheet for details.    The patient was evaluated and all clinical and laboratory data relevant to the treatment was reviewed, and a decision was made to proceed with the Apheresis procedure.    I was available to the clinical staff throughout the procedure. The next scheduled procedure will be at a later date, per primary Hematologist.    Significant Surgical Tasks Conducted by the Assistant(s): Not applicable    Complications: None  Estimated Blood Loss (EBL): None  Implants: None   Specimens: None    Christiano Martinez MD, MS  Section of Transfusion Medicine & Blood Bank  Department of Pathology and Laboratory Medicine  [facility]  [phone] (Blood Bank Offices)  09/29/2017

## 2017-10-02 LAB
HGB FRACT BLD ELPH-IMP: NORMAL
HGB S MFR BLD ELPH: 18 %

## 2018-04-10 ENCOUNTER — OFFICE VISIT (OUTPATIENT)
Dept: HEMATOLOGY/ONCOLOGY | Facility: CLINIC | Age: 28
End: 2018-04-10
Payer: MEDICARE

## 2018-04-10 ENCOUNTER — LAB VISIT (OUTPATIENT)
Dept: LAB | Facility: HOSPITAL | Age: 28
End: 2018-04-10
Attending: INTERNAL MEDICINE
Payer: MEDICARE

## 2018-04-10 VITALS
RESPIRATION RATE: 16 BRPM | HEIGHT: 65 IN | DIASTOLIC BLOOD PRESSURE: 74 MMHG | OXYGEN SATURATION: 85 % | HEART RATE: 108 BPM | SYSTOLIC BLOOD PRESSURE: 107 MMHG

## 2018-04-10 DIAGNOSIS — D57.1 HB-SS DISEASE WITHOUT CRISIS: ICD-10-CM

## 2018-04-10 DIAGNOSIS — D57.00 HB-SS DISEASE WITH CRISIS: Primary | ICD-10-CM

## 2018-04-10 DIAGNOSIS — Z86.73 HISTORY OF CVA (CEREBROVASCULAR ACCIDENT): ICD-10-CM

## 2018-04-10 LAB
ABO + RH BLD: NORMAL
ALBUMIN SERPL BCP-MCNC: 2.7 G/DL
ALP SERPL-CCNC: 196 U/L
ALT SERPL W/O P-5'-P-CCNC: 16 U/L
ANION GAP SERPL CALC-SCNC: 9 MMOL/L
ANISOCYTOSIS BLD QL SMEAR: ABNORMAL
AST SERPL-CCNC: 37 U/L
BASOPHILS # BLD AUTO: 0.06 K/UL
BASOPHILS NFR BLD: 0.5 %
BILIRUB SERPL-MCNC: 1.8 MG/DL
BLD GP AB SCN CELLS X3 SERPL QL: NORMAL
BUN SERPL-MCNC: 9 MG/DL
CALCIUM SERPL-MCNC: 8 MG/DL
CHLORIDE SERPL-SCNC: 110 MMOL/L
CO2 SERPL-SCNC: 22 MMOL/L
CREAT SERPL-MCNC: 0.6 MG/DL
DIFFERENTIAL METHOD: ABNORMAL
EOSINOPHIL # BLD AUTO: 0.5 K/UL
EOSINOPHIL NFR BLD: 3.9 %
ERYTHROCYTE [DISTWIDTH] IN BLOOD BY AUTOMATED COUNT: 26 %
EST. GFR  (AFRICAN AMERICAN): >60 ML/MIN/1.73 M^2
EST. GFR  (NON AFRICAN AMERICAN): >60 ML/MIN/1.73 M^2
FERRITIN SERPL-MCNC: 2453 NG/ML
GLUCOSE SERPL-MCNC: 89 MG/DL
HCT VFR BLD AUTO: 22.9 %
HGB BLD-MCNC: 7.9 G/DL
HOWELL-JOLLY BOD BLD QL SMEAR: ABNORMAL
HYPOCHROMIA BLD QL SMEAR: ABNORMAL
IMM GRANULOCYTES # BLD AUTO: 0.06 K/UL
IMM GRANULOCYTES NFR BLD AUTO: 0.5 %
LYMPHOCYTES # BLD AUTO: 3.9 K/UL
LYMPHOCYTES NFR BLD: 33.1 %
MCH RBC QN AUTO: 33.9 PG
MCHC RBC AUTO-ENTMCNC: 34.5 G/DL
MCV RBC AUTO: 98 FL
MONOCYTES # BLD AUTO: 1.8 K/UL
MONOCYTES NFR BLD: 15 %
NEUTROPHILS # BLD AUTO: 5.6 K/UL
NEUTROPHILS NFR BLD: 47 %
NRBC BLD-RTO: 9 /100 WBC
OVALOCYTES BLD QL SMEAR: ABNORMAL
PLATELET # BLD AUTO: 284 K/UL
PLATELET BLD QL SMEAR: ABNORMAL
PMV BLD AUTO: 9.9 FL
POIKILOCYTOSIS BLD QL SMEAR: ABNORMAL
POLYCHROMASIA BLD QL SMEAR: ABNORMAL
POTASSIUM SERPL-SCNC: 4.3 MMOL/L
PROT SERPL-MCNC: 7.6 G/DL
RBC # BLD AUTO: 2.33 M/UL
RETICS/RBC NFR AUTO: >23 %
SICKLE CELLS BLD QL SMEAR: ABNORMAL
SODIUM SERPL-SCNC: 141 MMOL/L
TARGETS BLD QL SMEAR: ABNORMAL
WBC # BLD AUTO: 11.92 K/UL

## 2018-04-10 PROCEDURE — 82728 ASSAY OF FERRITIN: CPT

## 2018-04-10 PROCEDURE — 80053 COMPREHEN METABOLIC PANEL: CPT

## 2018-04-10 PROCEDURE — 99214 OFFICE O/P EST MOD 30 MIN: CPT | Mod: S$PBB,,, | Performed by: INTERNAL MEDICINE

## 2018-04-10 PROCEDURE — 86901 BLOOD TYPING SEROLOGIC RH(D): CPT

## 2018-04-10 PROCEDURE — 85025 COMPLETE CBC W/AUTO DIFF WBC: CPT

## 2018-04-10 PROCEDURE — 85045 AUTOMATED RETICULOCYTE COUNT: CPT

## 2018-04-10 PROCEDURE — 99213 OFFICE O/P EST LOW 20 MIN: CPT | Mod: PBBFAC,25 | Performed by: INTERNAL MEDICINE

## 2018-04-10 PROCEDURE — 99999 PR PBB SHADOW E&M-EST. PATIENT-LVL III: CPT | Mod: PBBFAC,,, | Performed by: INTERNAL MEDICINE

## 2018-04-10 NOTE — Clinical Note
-cbc, cmp, type and screen, ferritin, retic, hemoglobin,  MD appt, and exchange transfusion in 1 month

## 2018-04-10 NOTE — PROGRESS NOTES
SECTION OF HEMATOLOGY AND BONE MARROW TRANSPLANT  Return  Patient Visit   2018  Referred by:  No ref. provider found  Referred for: SCD, exchange transfusion     CHIEF COMPLAINT:   No chief complaint on file.      HISTORY OF PRESENT ILLNESS:   28 yo female, non verbal due to multiple prior cva, sickle cell disease, referred 2016 for exchange transfusion.   She comes accompanied by her brother and his partner.  The patient's aunt was her previous full time caretaker but recently  and the brother and girlfriend took over care.  They do not know many details of her medical history.  Patient was previously seen at [facility] and apparently was getting monthly exchange transfusions via port but that transportation burden between [place] and [place] had become too much.  She recently established care with Dr. Song in [place] who placed the patient on hydrea.  Caretaker is unaware of any other specific sickle related complications.  The patient goes to the ED infrequently and it has been over a year since her last admission.       She was received monthly exchange transfusions, last was 2017, but did not return  For any subsequent ones due to transportation issues.  Family did not notify clinic. She has had no significant change in her clinical status. Remains wheelchair bound, contractured,  Minimally verbal.    Comes to clinic with her brother.   PAST MEDICAL HISTORY:   Past Medical History:   Diagnosis Date    Anemia     Encounter for blood transfusion     Neuromuscular disorder     Seizures     Sickle cell anemia     Stroke        PAST SURGICAL HISTORY:   Past Surgical History:   Procedure Laterality Date    PORTACATH PLACEMENT         PAST SOCIAL HISTORY:   reports that she has never smoked. She does not have any smokeless tobacco history on file. She reports that she does not drink alcohol or use drugs.    FAMILY HISTORY:  Family History   Problem Relation Age  of Onset    Kidney failure Mother     No Known Problems Brother        CURRENT MEDICATIONS:   Current Outpatient Prescriptions   Medication Sig    baclofen (LIORESAL) 10 MG tablet Take 1 tablet by mouth 3 (three) times daily as needed.    clonazePAM (KLONOPIN) 0.5 MG tablet Take 1 tablet by mouth 2 (two) times daily.    folic acid (FOLVITE) 1 MG tablet Take 1 mg by mouth once daily.    hydrocodone-acetaminophen 7.5-325mg (NORCO) 7.5-325 mg per tablet Take 1 tablet by mouth 3 (three) times daily.    levetiracetam (KEPPRA) 1000 MG tablet Take 1 tablet by mouth 2 (two) times daily.    senna-docusate 8.6-50 mg (SENNA WITH DOCUSATE SODIUM) 8.6-50 mg per tablet Take 1 tablet by mouth once daily.     No current facility-administered medications for this visit.      ALLERGIES:   Review of patient's allergies indicates:  No Known Allergies          REVIEW OF SYSTEMS:   Unable to obtain ROS due to mental status      PHYSICAL EXAM:   Vitals:    04/10/18 0842   BP: 107/74   Pulse: 108   Resp: 16       General - non verbal, contractured wheelchair bound, thin,; appears comfortable  Head & Face - no sinus tenderness  Eyes - normal conjunctivae and lids   ENT - normal external auditory canals and tympanic membranes bilaterally oropharynx clear,  Normal dentition and gums  Neck - normal thyroid  Chest and Lung - normal respiratory effort, clear to auscultation bilaterally   Cardiovascular - RRR with no MGR, normal S1 and S2; no pedal edema  Abdomen -  soft, nontender, no palpable hepatomegaly or splenomegaly  Lymph - no palpable lymphadenopathy  Extremities - unremarkable nails and digits  Heme - no bruising, petechiae, pallor  Skin - no rashes or lesion      ECOG Performance Status: (foot note - ECOG PS provided by Eastern Cooperative Oncology Group) 3 - Symptomatic, >50% confined to bed    Karnofsky Performance Score:  40%- Disabled: Requires Special Care and Assistance  DATA:   Lab Results   Component Value Date    WBC  11.92 04/10/2018    HGB 7.9 (L) 04/10/2018    HCT 22.9 (L) 04/10/2018    MCV 98 04/10/2018     04/10/2018     Gran # (ANC)   Date Value Ref Range Status   04/10/2018 5.6 1.8 - 7.7 K/uL Final     Gran%   Date Value Ref Range Status   04/10/2018 47.0 38.0 - 73.0 % Final     CMP  Sodium   Date Value Ref Range Status   04/10/2018 141 136 - 145 mmol/L Final     Potassium   Date Value Ref Range Status   04/10/2018 4.3 3.5 - 5.1 mmol/L Final     Chloride   Date Value Ref Range Status   04/10/2018 110 95 - 110 mmol/L Final     CO2   Date Value Ref Range Status   04/10/2018 22 (L) 23 - 29 mmol/L Final     Glucose   Date Value Ref Range Status   04/10/2018 89 70 - 110 mg/dL Final     BUN, Bld   Date Value Ref Range Status   04/10/2018 9 6 - 20 mg/dL Final     Creatinine   Date Value Ref Range Status   04/10/2018 0.6 0.5 - 1.4 mg/dL Final     Calcium   Date Value Ref Range Status   04/10/2018 8.0 (L) 8.7 - 10.5 mg/dL Final     Total Protein   Date Value Ref Range Status   04/10/2018 7.6 6.0 - 8.4 g/dL Final     Albumin   Date Value Ref Range Status   04/10/2018 2.7 (L) 3.5 - 5.2 g/dL Final     Total Bilirubin   Date Value Ref Range Status   04/10/2018 1.8 (H) 0.1 - 1.0 mg/dL Final     Comment:     For infants and newborns, interpretation of results should be based  on gestational age, weight and in agreement with clinical  observations.  Premature Infant recommended reference ranges:  Up to 24 hours.............<8.0 mg/dL  Up to 48 hours............<12.0 mg/dL  3-5 days..................<15.0 mg/dL  6-29 days.................<15.0 mg/dL       Alkaline Phosphatase   Date Value Ref Range Status   04/10/2018 196 (H) 55 - 135 U/L Final     AST   Date Value Ref Range Status   04/10/2018 37 10 - 40 U/L Final     ALT   Date Value Ref Range Status   04/10/2018 16 10 - 44 U/L Final     Anion Gap   Date Value Ref Range Status   04/10/2018 9 8 - 16 mmol/L Final     eGFR if    Date Value Ref Range Status    04/10/2018 >60.0 >60 mL/min/1.73 m^2 Final     eGFR if non    Date Value Ref Range Status   04/10/2018 >60.0 >60 mL/min/1.73 m^2 Final     Comment:     Calculation used to obtain the estimated glomerular filtration  rate (eGFR) is the CKD-EPI equation.        Lab Results   Component Value Date    FERRITIN 2,453 (H) 04/10/2018   Results for LUIS SHAH (MRN [id number]) as of 12/1/2016 06:37   Ref. Range 11/30/2016 09:12   Retic Latest Ref Range: 0.5 - 2.5 % 5.3 (H)     Basket      Reviewed  Result Note  View in In Basket        MyChart Results Release      MyChart Status: Inactive           Hemoglobin Electrophoresis,Hgb A2 Nate.   Status:  Final result   Visible to patient:  No (Not Released) Next appt:  None Dx:  Hb-SS disease with crisis Order: [id number]           Ref Range & Units 1d ago   3wk ago   3mo ago      Hgb A2 Quant 2.2 - 3.2 % 3.4 (H)  2.7    Hemoglobin Bands  Hb A and Hb S Hb F and Hb A2  Hb S , Hb F , Hb A2    Hemoglobin Electrophoresis Interp  See comment See commentCM See commentCM   Comments: Interpreted by Vanessa Hatch M.D.   There are prominent bands in the A and S positions,   measuring approximately 45 % and 42 % respectively.  There is also a   hemoglobin F band of approximately 8%.  This   pattern is consistent with the patient history of sickle cell anemia   and recent RBC transfusion.            Comments: Interpreted by Vanessa Hatch M.D.  - 5/12/17   Hemoglobins A, S and F are present measuring approximately 43%, 45%   and 9% respectively.  This pattern is consistent with the patient   history of sickle cell anemia and RBC transfusion.      Hgb A2 Quant 2.2 - 3.2 % 3.3   3.0     Hemoglobin Bands   Hb A , ...   Hb A an...    Hb A , Hb S , Hb F , Hb A2    Hemoglobin Electrophoresis Interp   See com...  See co...CM  See co...CM  See co...CM   See comment   Comments: Hemoglobins A and S are present, measuring approximately 46% and 50%   respectively.  There  is also a faint hemoglobin F band of   approximately 1%.  Consistent with history.   Interpreted by Fina Boyer M.D.        TTE - 3/6/17  CONCLUSIONS     1 - Normal left ventricular systolic function (EF 55-60%).     2 - Normal left ventricular diastolic function.     3 - Normal right ventricular systolic function .  ASSESSMENT AND PLAN:   Encounter Diagnoses   Name Primary?    Hb-SS disease with crisis Yes    History of CVA (cerebrovascular accident)      -history of SCD with at least multiple CVA now nonverbal and wheelchair bound, contractrued   -accompanying caretakers unclear of her specific medical history; she was receiving monthly exchange transfusions at [facility] with interruption due to social issues; resumed exchanged transfusions at [facility] February 2017  -recommend continue hydrea 500mg daily; dose not appear to have frequent pain crisis  -continue folic acid daily  -She was received monthly exchange transfusions, last was august 2017, but did not return  For any subsequent ones due to transportation issues.   -plan to reinitiate monthly exchange  Transfusions next month   -continue al other current medications  - secondary prophylactic monthly exchange transfusions are indicated to prevent further CVA with goal  To keep hgb S <30%  -discussed iron chelation at length with her family and they do not want to proceed given risks and C       Sickle Cell Disease Monitoring   1)Hydrea  -as above   2)Iron Overload  -as above; she has no over evidence of end organ damage from iron overload  3)AVN  -GORAN  4)LE Ulcerations  -GORAN  5)HTN  -normotensive today  6)Opthalmic  -see by [facility] opthalmology;  Next appt due April 2018; will discuss local referral   7)Pain  -appears comfortable today  -continue prn norco  8)CardioPulmonary  - baseline TTE today with no major abnormality ; next due march 2019    9)Renal  -attempted to obtain urine pr/cr at next appt but would require straight cath which family refusing      10)Neuro/CVA  -continue keppra for prior seizure  -exchange transfusiosn as above    11)Vaccines   -will need to readminister vaccines and document at [facility]; scheduled for next appt   - HIB series, menactra, prevnar followed by pneumovax after 8 weeks   -address at next appt     12)Contraception  -NA      Follow Up:  Cbc, cmp, type and screen, retic, ferritin,  Hemoglobin s quant, , and MD appt and exchange transfusion in one month   Jacob Hanson MD  Hematology/Oncology/Bone Marrow Transplant

## 2018-04-11 LAB — HEMOGLOBIN A1C REFERRAL TEST: NORMAL

## 2018-05-31 ENCOUNTER — TELEPHONE (OUTPATIENT)
Dept: HEMATOLOGY/ONCOLOGY | Facility: CLINIC | Age: 28
End: 2018-05-31

## 2018-05-31 ENCOUNTER — HOSPITAL ENCOUNTER (OUTPATIENT)
Dept: TRANSFUSION MEDICINE | Facility: HOSPITAL | Age: 28
Discharge: HOME | End: 2018-05-31
Attending: INTERNAL MEDICINE
Payer: MEDICARE

## 2018-05-31 ENCOUNTER — OFFICE VISIT (OUTPATIENT)
Dept: HEMATOLOGY/ONCOLOGY | Facility: CLINIC | Age: 28
End: 2018-05-31
Payer: MEDICARE

## 2018-05-31 VITALS
HEART RATE: 114 BPM | TEMPERATURE: 99 F | HEIGHT: 65 IN | SYSTOLIC BLOOD PRESSURE: 110 MMHG | DIASTOLIC BLOOD PRESSURE: 79 MMHG

## 2018-05-31 VITALS
SYSTOLIC BLOOD PRESSURE: 102 MMHG | HEIGHT: 65 IN | OXYGEN SATURATION: 92 % | DIASTOLIC BLOOD PRESSURE: 67 MMHG | HEART RATE: 108 BPM | TEMPERATURE: 98 F | RESPIRATION RATE: 18 BRPM

## 2018-05-31 DIAGNOSIS — D57.1 HB-SS DISEASE WITHOUT CRISIS: ICD-10-CM

## 2018-05-31 DIAGNOSIS — Z86.73 HISTORY OF CVA (CEREBROVASCULAR ACCIDENT): Primary | ICD-10-CM

## 2018-05-31 DIAGNOSIS — D57.1 HB-SS DISEASE WITHOUT CRISIS: Primary | ICD-10-CM

## 2018-05-31 LAB
BLD PROD TYP BPU: NORMAL
BLOOD UNIT EXPIRATION DATE: NORMAL
BLOOD UNIT TYPE CODE: 1700
BLOOD UNIT TYPE: NORMAL
CODING SYSTEM: NORMAL
DISPENSE STATUS: NORMAL
NUM UNITS TRANS PACKED RBC: NORMAL

## 2018-05-31 PROCEDURE — 86920 COMPATIBILITY TEST SPIN: CPT

## 2018-05-31 PROCEDURE — 63600175 PHARM REV CODE 636 W HCPCS: Mod: JG | Performed by: PATHOLOGY

## 2018-05-31 PROCEDURE — 36415 COLL VENOUS BLD VENIPUNCTURE: CPT

## 2018-05-31 PROCEDURE — P9016 RBC LEUKOCYTES REDUCED: HCPCS

## 2018-05-31 PROCEDURE — 86902 BLOOD TYPE ANTIGEN DONOR EA: CPT

## 2018-05-31 PROCEDURE — 36511 APHERESIS WBC: CPT | Mod: ,,, | Performed by: PATHOLOGY

## 2018-05-31 PROCEDURE — 99213 OFFICE O/P EST LOW 20 MIN: CPT | Mod: PBBFAC,25 | Performed by: INTERNAL MEDICINE

## 2018-05-31 PROCEDURE — 83020 HEMOGLOBIN ELECTROPHORESIS: CPT

## 2018-05-31 PROCEDURE — 99999 PR PBB SHADOW E&M-EST. PATIENT-LVL III: CPT | Mod: PBBFAC,,, | Performed by: INTERNAL MEDICINE

## 2018-05-31 PROCEDURE — 36512 APHERESIS RBC: CPT

## 2018-05-31 PROCEDURE — 99214 OFFICE O/P EST MOD 30 MIN: CPT | Mod: S$PBB,,, | Performed by: INTERNAL MEDICINE

## 2018-05-31 RX ORDER — DIPHENHYDRAMINE HYDROCHLORIDE 50 MG/ML
50 INJECTION INTRAMUSCULAR; INTRAVENOUS ONCE
Status: COMPLETED | OUTPATIENT
Start: 2018-05-31 | End: 2018-05-31

## 2018-05-31 RX ORDER — HEPARIN SODIUM 1000 [USP'U]/ML
4400 INJECTION, SOLUTION INTRAVENOUS; SUBCUTANEOUS ONCE
Status: COMPLETED | OUTPATIENT
Start: 2018-05-31 | End: 2018-05-31

## 2018-05-31 RX ADMIN — ALTEPLASE 4 MG: 2.2 INJECTION, POWDER, LYOPHILIZED, FOR SOLUTION INTRAVENOUS at 01:05

## 2018-05-31 RX ADMIN — DIPHENHYDRAMINE HYDROCHLORIDE 25 MG: 50 INJECTION INTRAMUSCULAR; INTRAVENOUS at 02:05

## 2018-05-31 RX ADMIN — HEPARIN SODIUM 4400 UNITS: 1000 INJECTION, SOLUTION INTRAVENOUS; SUBCUTANEOUS at 04:05

## 2018-05-31 NOTE — Clinical Note
-Cbc, cmp, type and screen, retic, ferritin,  Hemoglobin s quant, , and NP  appt and exchange transfusion in one month  -Same but MD appt in 2 months

## 2018-05-31 NOTE — PROGRESS NOTES
SECTION OF HEMATOLOGY AND BONE MARROW TRANSPLANT  Return  Patient Visit   2018  Referred by:  No ref. provider found  Referred for: SCD, exchange transfusion     CHIEF COMPLAINT:   No chief complaint on file.      HISTORY OF PRESENT ILLNESS:   29 yo female, non verbal due to multiple prior cva, sickle cell disease, referred 2016 for exchange transfusion.   She comes accompanied by her brother and his partner.  The patient's aunt was her previous full time caretaker but recently  and the brother and girlfriend took over care.  They do not know many details of her medical history.  Patient was previously seen at [facility] and apparently was getting monthly exchange transfusions via port but that transportation burden between [place] and [place] had become too much.  She recently established care with Dr. Song in [place] who placed the patient on hydrea.  Caretaker is unaware of any other specific sickle related complications.  The patient goes to the ED infrequently and it has been over a year since her last admission.       She was received monthly exchange transfusions,   Presents for transfusion today.  No change in clinical status since our last appt.      PAST MEDICAL HISTORY:   Past Medical History:   Diagnosis Date    Anemia     Encounter for blood transfusion     Neuromuscular disorder     Seizures     Sickle cell anemia     Stroke        PAST SURGICAL HISTORY:   Past Surgical History:   Procedure Laterality Date    PORTACATH PLACEMENT         PAST SOCIAL HISTORY:   reports that she has never smoked. She does not have any smokeless tobacco history on file. She reports that she does not drink alcohol or use drugs.    FAMILY HISTORY:  Family History   Problem Relation Age of Onset    Kidney failure Mother     No Known Problems Brother        CURRENT MEDICATIONS:   Current Outpatient Prescriptions   Medication Sig    baclofen (LIORESAL) 10 MG tablet Take 1  tablet by mouth 3 (three) times daily as needed.    clonazePAM (KLONOPIN) 0.5 MG tablet Take 1 tablet by mouth 2 (two) times daily.    folic acid (FOLVITE) 1 MG tablet Take 1 mg by mouth once daily.    hydrocodone-acetaminophen 7.5-325mg (NORCO) 7.5-325 mg per tablet Take 1 tablet by mouth 3 (three) times daily.    levetiracetam (KEPPRA) 1000 MG tablet Take 1 tablet by mouth 2 (two) times daily.    senna-docusate 8.6-50 mg (SENNA WITH DOCUSATE SODIUM) 8.6-50 mg per tablet Take 1 tablet by mouth once daily.     No current facility-administered medications for this visit.      ALLERGIES:   Review of patient's allergies indicates:  No Known Allergies          REVIEW OF SYSTEMS:   Unable to obtain ROS due to mental status      PHYSICAL EXAM:   Vitals:    05/31/18 1309   BP: 102/67   Pulse: 108   Resp: 18   Temp: 97.8 °F (36.6 °C)       General - non verbal, contractured wheelchair bound, thin,; appears comfortable  Head & Face - no sinus tenderness  Eyes - normal conjunctivae and lids   ENT - normal external auditory canals and tympanic membranes bilaterally oropharynx clear,  Normal dentition and gums  Neck - normal thyroid  Chest and Lung - normal respiratory effort, clear to auscultation bilaterally   Cardiovascular - RRR with no MGR, normal S1 and S2; no pedal edema  Abdomen -  soft, nontender, no palpable hepatomegaly or splenomegaly  Lymph - no palpable lymphadenopathy  Extremities - unremarkable nails and digits  Heme - no bruising, petechiae, pallor  Skin - no rashes or lesion      ECOG Performance Status: (foot note - ECOG PS provided by Eastern Cooperative Oncology Group) 3 - Symptomatic, >50% confined to bed    Karnofsky Performance Score:  40%- Disabled: Requires Special Care and Assistance  DATA:   Lab Results   Component Value Date    WBC 13.65 (H) 05/31/2018    HGB 8.1 (L) 05/31/2018    HCT 23.6 (L) 05/31/2018    MCV 91 05/31/2018     (H) 05/31/2018     Gran # (ANC)   Date Value Ref Range  Status   05/31/2018 5.6 1.8 - 7.7 K/uL Final     Gran%   Date Value Ref Range Status   05/31/2018 41.2 38.0 - 73.0 % Final     CMP  Sodium   Date Value Ref Range Status   05/31/2018 142 136 - 145 mmol/L Final     Potassium   Date Value Ref Range Status   05/31/2018 4.3 3.5 - 5.1 mmol/L Final     Chloride   Date Value Ref Range Status   05/31/2018 113 (H) 95 - 110 mmol/L Final     CO2   Date Value Ref Range Status   05/31/2018 22 (L) 23 - 29 mmol/L Final     Glucose   Date Value Ref Range Status   05/31/2018 101 70 - 110 mg/dL Final     BUN, Bld   Date Value Ref Range Status   05/31/2018 15 6 - 20 mg/dL Final     Creatinine   Date Value Ref Range Status   05/31/2018 0.6 0.5 - 1.4 mg/dL Final     Calcium   Date Value Ref Range Status   05/31/2018 8.3 (L) 8.7 - 10.5 mg/dL Final     Total Protein   Date Value Ref Range Status   05/31/2018 7.9 6.0 - 8.4 g/dL Final     Albumin   Date Value Ref Range Status   05/31/2018 2.7 (L) 3.5 - 5.2 g/dL Final     Total Bilirubin   Date Value Ref Range Status   05/31/2018 3.0 (H) 0.1 - 1.0 mg/dL Final     Comment:     For infants and newborns, interpretation of results should be based  on gestational age, weight and in agreement with clinical  observations.  Premature Infant recommended reference ranges:  Up to 24 hours.............<8.0 mg/dL  Up to 48 hours............<12.0 mg/dL  3-5 days..................<15.0 mg/dL  6-29 days.................<15.0 mg/dL       Alkaline Phosphatase   Date Value Ref Range Status   05/31/2018 191 (H) 55 - 135 U/L Final     AST   Date Value Ref Range Status   05/31/2018 62 (H) 10 - 40 U/L Final     ALT   Date Value Ref Range Status   05/31/2018 34 10 - 44 U/L Final     Anion Gap   Date Value Ref Range Status   05/31/2018 7 (L) 8 - 16 mmol/L Final     eGFR if    Date Value Ref Range Status   05/31/2018 >60.0 >60 mL/min/1.73 m^2 Final     eGFR if non    Date Value Ref Range Status   05/31/2018 >60.0 >60 mL/min/1.73 m^2  Final     Comment:     Calculation used to obtain the estimated glomerular filtration  rate (eGFR) is the CKD-EPI equation.        Lab Results   Component Value Date    FERRITIN 3,826 (H) 05/31/2018   Results for LUIS SHAH (MRN [id number]) as of 12/1/2016 06:37   Ref. Range 11/30/2016 09:12   Retic Latest Ref Range: 0.5 - 2.5 % 5.3 (H)     Basket      Reviewed  Result Note  View in In Basket        MyChart Results Release      MyChart Status: Inactive           Hemoglobin Electrophoresis,Hgb A2 Nate.   Status:  Final result   Visible to patient:  No (Not Released) Next appt:  None Dx:  Hb-SS disease with crisis Order: [id number]           Ref Range & Units 1d ago   3wk ago   3mo ago      Hgb A2 Quant 2.2 - 3.2 % 3.4 (H)  2.7    Hemoglobin Bands  Hb A and Hb S Hb F and Hb A2  Hb S , Hb F , Hb A2    Hemoglobin Electrophoresis Interp  See comment See commentCM See commentCM   Comments: Interpreted by Vanessa Hatch M.D.   There are prominent bands in the A and S positions,   measuring approximately 45 % and 42 % respectively.  There is also a   hemoglobin F band of approximately 8%.  This   pattern is consistent with the patient history of sickle cell anemia   and recent RBC transfusion.            Comments: Interpreted by Vanessa Hatch M.D.  - 5/12/17   Hemoglobins A, S and F are present measuring approximately 43%, 45%   and 9% respectively.  This pattern is consistent with the patient   history of sickle cell anemia and RBC transfusion.      Hgb A2 Quant 2.2 - 3.2 % 3.3   3.0     Hemoglobin Bands   Hb A , ...   Hb A an...    Hb A , Hb S , Hb F , Hb A2    Hemoglobin Electrophoresis Interp   See com...  See co...CM  See co...CM  See co...CM   See comment   Comments: Hemoglobins A and S are present, measuring approximately 46% and 50%   respectively.  There is also a faint hemoglobin F band of   approximately 1%.  Consistent with history.   Interpreted by Fina Boyer M.D.        TTE -  3/6/17  CONCLUSIONS     1 - Normal left ventricular systolic function (EF 55-60%).     2 - Normal left ventricular diastolic function.     3 - Normal right ventricular systolic function .  ASSESSMENT AND PLAN:   Encounter Diagnoses   Name Primary?    History of CVA (cerebrovascular accident) Yes    Hb-SS disease without crisis      -history of SCD with at least multiple CVA now nonverbal and wheelchair bound, contractrued   -accompanying caretakers unclear of her specific medical history; she was receiving monthly exchange transfusions at [facility] with interruption due to social issues; resumed exchanged transfusions at [facility] February 2017  -recommend continue hydrea 500mg daily; dose not appear to have frequent pain crisis  -continue folic acid daily  -She was received monthly exchange transfusions  -continue monthly exchange transfusions  -continue al other current medications  - secondary prophylactic monthly exchange transfusions are indicated to prevent further CVA with goal  To keep hgb S <30%  -discussed iron chelation at length with her family and they do not want to proceed given risks and C       Sickle Cell Disease Monitoring   1)Hydrea  -as above   2)Iron Overload  -as above; she has no over evidence of end organ damage from iron overload  3)AVN  -GORAN  4)LE Ulcerations  -GORAN  5)HTN  -normotensive today  6)Opthalmic  -see by [facility] opthalmology;  Next appt due April 2018; will discuss local referral   7)Pain  -appears comfortable today  -continue prn norco  8)CardioPulmonary  - baseline TTE today with no major abnormality ; next due march 2019    9)Renal  -attempted to obtain urine pr/cr at next appt but would require straight cath which family refusing     10)Neuro/CVA  -continue keppra for prior seizure  -exchange transfusiosn as above    11)Vaccines   -will need to readminister vaccines and document at [facility]; scheduled for next appt   - HIB series, menactra, prevnar followed by pneumovax after 8 weeks    -address at next appt     12)Contraception  -NA      Follow Up:  -Cbc, cmp, type and screen, retic, ferritin,  Hemoglobin s quant, , and NP  appt and exchange transfusion in one month   -Same but MD appt in 2 months   Jacob Hanson MD  Hematology/Oncology/Bone Marrow Transplant

## 2018-05-31 NOTE — PROCEDURES
[facility]  Pathology  Procedure Note    SUMMARY   Therapeutic Apheresis; Red Blood Cells  Date/Time: 5/31/2018 4:09 PM  Performed by: JODI MARTINEZ.  Authorized by: JODI MARTINEZ.     Date of Procedure: 5/31/2018     Procedure: Red Blood Cell Exchange    Provider: Jodi Martinez MD     Assisting Provider: None    Pre-Procedure Diagnosis: Sickle cell disease  Post-Procedure Diagnosis: Sickle cell disease    Follow-up Assessment: 28 year-old female with sickle cell disease and history of multiple CVAs presents for monthly RBC exchange transfusion. Patient is minimally verbal and is wheelchair bound. Care givers are present throughout procedure. Patient will be exchanged 4 units C, E, K antigen matched, LR, sickle negative, B+ blood. Goal exchange target is to increase post-exchange Hct to 30%.     Sickle cell disease, non-acute, carries a Category I Grade 1B indication for RBC exchange for stroke prophylaxis/iron overload prevention via the 2016 Journal of Clinical Apheresis Guidelines (Berger J et al. Journal of Clinical Apheresis 2016; 31:149-162.)     Overall, today's exchange was well tolerated and without complications.  Post-exchange HbS level drawn.  Next treatment will be scheduled based on post-exchange HbS level.    Pertinent Laboratory Data:   Complete Blood Count:   Lab Results   Component Value Date    HGB 8.1 (L) 05/31/2018    HCT 23.6 (L) 05/31/2018     (H) 05/31/2018    WBC 13.65 (H) 05/31/2018     Comprehensive Metabolic Panel:   Lab Results   Component Value Date     05/31/2018    K 4.3 05/31/2018     (H) 05/31/2018    CO2 22 (L) 05/31/2018     05/31/2018    BUN 15 05/31/2018    CREATININE 0.6 05/31/2018    CALCIUM 8.3 (L) 05/31/2018    PROT 7.9 05/31/2018    ALBUMIN 2.7 (L) 05/31/2018    BILITOT 3.0 (H) 05/31/2018    ALKPHOS 191 (H) 05/31/2018    AST 62 (H) 05/31/2018    ALT 34 05/31/2018    ANIONGAP 7 (L) 05/31/2018    EGFRNONAA >60.0 05/31/2018        Pertinent Medications:   Folic acid 1 mg daily  Hydrea 500mg daily    Review of patient's allergies indicates:  No Known Allergies    Anesthesia: None     Technical Procedures Used: Red Blood Cell Exchange: Approximately 4 units of packed red blood cells were exchanged.    Description of the Findings of the Procedure:   Please see Apheresis Nurse flowsheet for details.    The patient was evaluated and all clinical and laboratory data relevant to the treatment was reviewed, and a decision was made to proceed with the Apheresis procedure.    I was available to the clinical staff throughout the procedure. The next scheduled procedure will be at a later date, per primary Hematologist.    Significant Surgical Tasks Conducted by the Assistant(s): Not applicable    Complications: None  Estimated Blood Loss (EBL): None  Implants: None   Specimens: None    Christiano Martinez MD, MS  Section of Transfusion Medicine & Blood Bank  Department of Pathology and Laboratory Medicine  [facility]  [phone] (Blood Bank Offices)  05/31/2018

## 2018-05-31 NOTE — TELEPHONE ENCOUNTER
----- Message from Marie Rinaldi sent at 5/31/2018  1:29 PM CDT -----  Patient will need Cbc, cmp, type and screen, retic, ferritin,  Hemoglobin s quant lab orders entered as a standing order.

## 2018-05-31 NOTE — PROGRESS NOTES
Pt presented to outpatient apheresis dept in a wheelchair for a red blood cell exchange, she is accompanied by her brother.  She is alert, unable to determine orientation and pain due to patients limited verbal abilities, she appears comfortable.  Mid chest vortex port successfully accessed with 2, 16 gauge needles, cathflo instilled into both sides at 1355, withdrew with ease at 1455.  25 mg of benadryl given IVP.  Red cell exchange started at 1459 without difficulty.  Replacement fluids 4 units of pRBC's.  Tx ended at 1549. Hgb S drawn from port.  Port flushed and locked.  Pt tolerated tx well.  Next tx will be scheduled by Dr Hanson's office.  Pt exited dept in a wheelchair with her brother.

## 2018-06-01 LAB
HGB FRACT BLD ELPH-IMP: NORMAL
HGB S MFR BLD ELPH: 16 %

## 2019-08-15 PROBLEM — A41.9 SEPSIS: Status: ACTIVE | Noted: 2019-08-15

## 2022-05-24 NOTE — PROGRESS NOTES
Pt arrived to outpatient apheresis dept in a wheelchair, accompanied by her brother and sister-in-law.  She needs total assistance, she is non-verbal, she does understand and will nod her head yes or no when neccessary, her right arm is contracted and she is incontinent of bladder and bowel.  Vortex port is located mid upper chest, it was accessed with 2, 17 gauge high flow martinez needles at 12:15.  Cathflo was instilled at 12:18, withdrew with ease at 1323.  50 mg of benadryl given IVP at 1324.  Red blood cell exchange started at 1325 without difficulty.  Pt oriented x4.  Replacement fluids 5 units of pRBC's.  Pt nods her head no when asked if she has pain anywhere.  Tx ended at 1440.   Port flushed with with 20 mls of NS and locked with 2.2 mls of 1,000 unit/ml heparin on both sides.  Needles removed from port, site dressed appropriately when hemostasis achieved.  Pt tolerated tx well.  Dr Hanson's office will call to schedule her next appointment.  Pt exited dept in her wheelchair with her family.     You can access the FollowMyHealth Patient Portal offered by [facility] by registering at the following website: [email or web address]. By joining [facility]’s FollowMyHealth portal, you will also be able to view your health information using other applications (apps) compatible with our system.

## 2025-06-17 NOTE — PROGRESS NOTES
Medicare Preventive Visit Patient Instructions  Thank you for completing your Welcome to Medicare Visit or Medicare Annual Wellness Visit today. Your next wellness visit will be due in one year (6/18/2026).  The screening/preventive services that you may require over the next 5-10 years are detailed below. Some tests may not apply to you based off risk factors and/or age. Screening tests ordered at today's visit but not completed yet may show as past due. Also, please note that scanned in results may not display below.  Preventive Screenings:  Service Recommendations Previous Testing/Comments   Colorectal Cancer Screening  Colonoscopy    Fecal Occult Blood Test (FOBT)/Fecal Immunochemical Test (FIT)  Fecal DNA/Cologuard Test  Flexible Sigmoidoscopy Age: 45-75 years old   Colonoscopy: every 10 years (May be performed more frequently if at higher risk)  OR  FOBT/FIT: every 1 year  OR  Cologuard: every 3 years  OR  Sigmoidoscopy: every 5 years  Screening may be recommended earlier than age 45 if at higher risk for colorectal cancer. Also, an individualized decision between you and your healthcare provider will decide whether screening between the ages of 76-85 would be appropriate. Colonoscopy: 03/11/2006  FOBT/FIT: Not on file  Cologuard: 06/14/2024  Sigmoidoscopy: Not on file    Screening Current     Prostate Cancer Screening Individualized decision between patient and health care provider in men between ages of 55-69   Medicare will cover every 12 months beginning on the day after your 50th birthday PSA: 0.49 ng/mL           Hepatitis C Screening Once for adults born between 1945 and 1965  More frequently in patients at high risk for Hepatitis C Hep C Antibody: 12/12/2020    Screening Current   Diabetes Screening 1-2 times per year if you're at risk for diabetes or have pre-diabetes Fasting glucose: 95 mg/dL (6/10/2025)  A1C: 6.2 % (6/10/2025)  Screening Current   Cholesterol Screening Once every 5 years if you  Pneumococcal conjugate vaccine (prevnar 13) administered IM.  Pt tolerated well and left in NAD.    don't have a lipid disorder. May order more often based on risk factors. Lipid panel: 06/10/2025  Screening Not Indicated  History Lipid Disorder      Other Preventive Screenings Covered by Medicare:  Abdominal Aortic Aneurysm (AAA) Screening: covered once if your at risk. You're considered to be at risk if you have a family history of AAA or a male between the age of 65-75 who smoking at least 100 cigarettes in your lifetime.  Lung Cancer Screening: covers low dose CT scan once per year if you meet all of the following conditions: (1) Age 55-77; (2) No signs or symptoms of lung cancer; (3) Current smoker or have quit smoking within the last 15 years; (4) You have a tobacco smoking history of at least 20 pack years (packs per day x number of years you smoked); (5) You get a written order from a healthcare provider.  Glaucoma Screening: covered annually if you're considered high risk: (1) You have diabetes OR (2) Family history of glaucoma OR (3)  aged 50 and older OR (4)  American aged 65 and older  Osteoporosis Screening: covered every 2 years if you meet one of the following conditions: (1) Have a vertebral abnormality; (2) On glucocorticoid therapy for more than 3 months; (3) Have primary hyperparathyroidism; (4) On osteoporosis medications and need to assess response to drug therapy.  HIV Screening: covered annually if you're between the age of 15-65. Also covered annually if you are younger than 15 and older than 65 with risk factors for HIV infection. For pregnant patients, it is covered up to 3 times per pregnancy.    Immunizations:  Immunization Recommendations   Influenza Vaccine Annual influenza vaccination during flu season is recommended for all persons aged >= 6 months who do not have contraindications   Pneumococcal Vaccine   * Pneumococcal conjugate vaccine = PCV13 (Prevnar 13), PCV15 (Vaxneuvance), PCV20 (Prevnar 20)  * Pneumococcal polysaccharide vaccine = PPSV23 (Pneumovax)  Adults 19-65 yo with certain risk factors or if 65+ yo  If never received any pneumonia vaccine: recommend Prevnar 20 (PCV20)  Give PCV20 if previously received 1 dose of PCV13 or PPSV23   Hepatitis B Vaccine 3 dose series if at intermediate or high risk (ex: diabetes, end stage renal disease, liver disease)   Respiratory syncytial virus (RSV) Vaccine - COVERED BY MEDICARE PART D  * RSVPreF3 (Arexvy) CDC recommends that adults 60 years of age and older may receive a single dose of RSV vaccine using shared clinical decision-making (SCDM)   Tetanus (Td) Vaccine - COST NOT COVERED BY MEDICARE PART B Following completion of primary series, a booster dose should be given every 10 years to maintain immunity against tetanus. Td may also be given as tetanus wound prophylaxis.   Tdap Vaccine - COST NOT COVERED BY MEDICARE PART B Recommended at least once for all adults. For pregnant patients, recommended with each pregnancy.   Shingles Vaccine (Shingrix) - COST NOT COVERED BY MEDICARE PART B  2 shot series recommended in those 19 years and older who have or will have weakened immune systems or those 50 years and older     Health Maintenance Due:      Topic Date Due   • Colorectal Cancer Screening  06/14/2027   • Hepatitis C Screening  Completed     Immunizations Due:      Topic Date Due   • Pneumococcal Vaccine: 50+ Years (1 of 1 - PCV) Never done   • COVID-19 Vaccine (3 - 2024-25 season) 09/01/2024   • Influenza Vaccine (Season Ended) 09/01/2025     Advance Directives   What are advance directives?  Advance directives are legal documents that state your wishes and plans for medical care. These plans are made ahead of time in case you lose your ability to make decisions for yourself. Advance directives can apply to any medical decision, such as the treatments you want, and if you want to donate organs.   What are the types of advance directives?  There are many types of advance directives, and each state has rules about how  to use them. You may choose a combination of any of the following:  Living will:  This is a written record of the treatment you want. You can also choose which treatments you do not want, which to limit, and which to stop at a certain time. This includes surgery, medicine, IV fluid, and tube feedings.   Durable power of  for healthcare (DPAHC):  This is a written record that states who you want to make healthcare choices for you when you are unable to make them for yourself. This person, called a proxy, is usually a family member or a friend. You may choose more than 1 proxy.  Do not resuscitate (DNR) order:  A DNR order is used in case your heart stops beating or you stop breathing. It is a request not to have certain forms of treatment, such as CPR. A DNR order may be included in other types of advance directives.  Medical directive:  This covers the care that you want if you are in a coma, near death, or unable to make decisions for yourself. You can list the treatments you want for each condition. Treatment may include pain medicine, surgery, blood transfusions, dialysis, IV or tube feedings, and a ventilator (breathing machine).  Values history:  This document has questions about your views, beliefs, and how you feel and think about life. This information can help others choose the care that you would choose.  Why are advance directives important?  An advance directive helps you control your care. Although spoken wishes may be used, it is better to have your wishes written down. Spoken wishes can be misunderstood, or not followed. Treatments may be given even if you do not want them. An advance directive may make it easier for your family to make difficult choices about your care.   Weight Management   Why it is important to manage your weight:  Being overweight increases your risk of health conditions such as heart disease, high blood pressure, type 2 diabetes, and certain types of cancer. It can also  increase your risk for osteoarthritis, sleep apnea, and other respiratory problems. Aim for a slow, steady weight loss. Even a small amount of weight loss can lower your risk of health problems.  How to lose weight safely:  A safe and healthy way to lose weight is to eat fewer calories and get regular exercise. You can lose up about 1 pound a week by decreasing the number of calories you eat by 500 calories each day.   Healthy meal plan for weight management:  A healthy meal plan includes a variety of foods, contains fewer calories, and helps you stay healthy. A healthy meal plan includes the following:  Eat whole-grain foods more often.  A healthy meal plan should contain fiber. Fiber is the part of grains, fruits, and vegetables that is not broken down by your body. Whole-grain foods are healthy and provide extra fiber in your diet. Some examples of whole-grain foods are whole-wheat breads and pastas, oatmeal, brown rice, and bulgur.  Eat a variety of vegetables every day.  Include dark, leafy greens such as spinach, kale, yordan greens, and mustard greens. Eat yellow and orange vegetables such as carrots, sweet potatoes, and winter squash.   Eat a variety of fruits every day.  Choose fresh or canned fruit (canned in its own juice or light syrup) instead of juice. Fruit juice has very little or no fiber.  Eat low-fat dairy foods.  Drink fat-free (skim) milk or 1% milk. Eat fat-free yogurt and low-fat cottage cheese. Try low-fat cheeses such as mozzarella and other reduced-fat cheeses.  Choose meat and other protein foods that are low in fat.  Choose beans or other legumes such as split peas or lentils. Choose fish, skinless poultry (chicken or turkey), or lean cuts of red meat (beef or pork). Before you cook meat or poultry, cut off any visible fat.   Use less fat and oil.  Try baking foods instead of frying them. Add less fat, such as margarine, sour cream, regular salad dressing and mayonnaise to foods. Eat  fewer high-fat foods. Some examples of high-fat foods include french fries, doughnuts, ice cream, and cakes.  Eat fewer sweets.  Limit foods and drinks that are high in sugar. This includes candy, cookies, regular soda, and sweetened drinks.  Exercise:  Exercise at least 30 minutes per day on most days of the week. Some examples of exercise include walking, biking, dancing, and swimming. You can also fit in more physical activity by taking the stairs instead of the elevator or parking farther away from stores. Ask your healthcare provider about the best exercise plan for you.    © Copyright [employer] 2018 Information is for End User's use only and may not be sold, redistributed or otherwise used for commercial purposes. All illustrations and images included in CareNotes® are the copyrighted property of [employer] or [employer]